# Patient Record
Sex: FEMALE | Race: WHITE | NOT HISPANIC OR LATINO | Employment: UNEMPLOYED | URBAN - NONMETROPOLITAN AREA
[De-identification: names, ages, dates, MRNs, and addresses within clinical notes are randomized per-mention and may not be internally consistent; named-entity substitution may affect disease eponyms.]

---

## 2023-07-11 DIAGNOSIS — R10.823 RIGHT LOWER QUADRANT REBOUND ABDOMINAL TENDERNESS: ICD-10-CM

## 2023-07-11 DIAGNOSIS — I95.9 HYPOTENSION, UNSPECIFIED: ICD-10-CM

## 2023-07-11 DIAGNOSIS — R19.5 OTHER FECAL ABNORMALITIES: ICD-10-CM

## 2023-07-11 DIAGNOSIS — Z83.79 FAMILY HISTORY OF OTHER DISEASES OF THE DIGESTIVE SYSTEM: ICD-10-CM

## 2023-07-11 DIAGNOSIS — R10.31 RIGHT LOWER QUADRANT PAIN: ICD-10-CM

## 2023-07-11 DIAGNOSIS — R63.4 ABNORMAL WEIGHT LOSS: ICD-10-CM

## 2023-07-21 ENCOUNTER — HOSPITAL ENCOUNTER (EMERGENCY)
Facility: HOSPITAL | Age: 28
Discharge: HOME | End: 2023-07-21
Attending: EMERGENCY MEDICINE
Payer: COMMERCIAL

## 2023-07-21 VITALS
SYSTOLIC BLOOD PRESSURE: 98 MMHG | BODY MASS INDEX: 19.49 KG/M2 | HEIGHT: 63 IN | HEART RATE: 80 BPM | TEMPERATURE: 97 F | RESPIRATION RATE: 16 BRPM | DIASTOLIC BLOOD PRESSURE: 58 MMHG | OXYGEN SATURATION: 98 % | WEIGHT: 110 LBS

## 2023-07-21 DIAGNOSIS — Z01.30 ENCOUNTER FOR BLOOD PRESSURE EXAMINATION: Primary | ICD-10-CM

## 2023-07-21 LAB
ANION GAP SERPL CALCULATED.3IONS-SCNC: 7 MMOL/L
ANISOCYTOSIS BLD QL SMEAR: PRESENT
ATRIAL RATE: 70 BPM
BASOPHILS # BLD MANUAL: 0 THOUSAND/UL (ref 0–0.1)
BASOPHILS NFR MAR MANUAL: 0 % (ref 0–1)
BUN SERPL-MCNC: 12 MG/DL (ref 5–25)
CALCIUM SERPL-MCNC: 8.5 MG/DL (ref 8.4–10.2)
CHLORIDE SERPL-SCNC: 105 MMOL/L (ref 96–108)
CO2 SERPL-SCNC: 24 MMOL/L (ref 21–32)
CREAT SERPL-MCNC: 0.58 MG/DL (ref 0.6–1.3)
EOSINOPHIL # BLD MANUAL: 0.19 THOUSAND/UL (ref 0–0.4)
EOSINOPHIL NFR BLD MANUAL: 5 % (ref 0–6)
ERYTHROCYTE [DISTWIDTH] IN BLOOD BY AUTOMATED COUNT: 13.3 % (ref 11.6–15.1)
GFR SERPL CREATININE-BSD FRML MDRD: 124 ML/MIN/1.73SQ M
GLUCOSE SERPL-MCNC: 79 MG/DL (ref 65–140)
HCT VFR BLD AUTO: 37.7 % (ref 34.8–46.1)
HGB BLD-MCNC: 12.4 G/DL (ref 11.5–15.4)
LG PLATELETS BLD QL SMEAR: PRESENT
LYMPHOCYTES # BLD AUTO: 2.03 THOUSAND/UL (ref 0.6–4.47)
LYMPHOCYTES # BLD AUTO: 53 % (ref 14–44)
MACROCYTES BLD QL AUTO: PRESENT
MCH RBC QN AUTO: 30.6 PG (ref 26.8–34.3)
MCHC RBC AUTO-ENTMCNC: 32.9 G/DL (ref 31.4–37.4)
MCV RBC AUTO: 93 FL (ref 82–98)
MONOCYTES # BLD AUTO: 0.34 THOUSAND/UL (ref 0–1.22)
MONOCYTES NFR BLD: 9 % (ref 4–12)
NEUTROPHILS # BLD MANUAL: 1.2 THOUSAND/UL (ref 1.85–7.62)
NEUTS BAND NFR BLD MANUAL: 3 % (ref 0–8)
NEUTS SEG NFR BLD AUTO: 29 % (ref 43–75)
PLATELET # BLD AUTO: 237 THOUSANDS/UL (ref 149–390)
PLATELET BLD QL SMEAR: ADEQUATE
PMV BLD AUTO: 10 FL (ref 8.9–12.7)
POIKILOCYTOSIS BLD QL SMEAR: PRESENT
POTASSIUM SERPL-SCNC: 3.9 MMOL/L (ref 3.5–5.3)
PR INTERVAL: 128 MS
QRS AXIS: 89 DEGREES
QRSD INTERVAL: 76 MS
QT INTERVAL: 380 MS
QTC INTERVAL: 410 MS
RBC # BLD AUTO: 4.05 MILLION/UL (ref 3.81–5.12)
SODIUM SERPL-SCNC: 136 MMOL/L (ref 135–147)
T WAVE AXIS: 23 DEGREES
VARIANT LYMPHS # BLD AUTO: 1 %
VENTRICULAR RATE: 70 BPM
WBC # BLD AUTO: 3.75 THOUSAND/UL (ref 4.31–10.16)

## 2023-07-21 PROCEDURE — 85007 BL SMEAR W/DIFF WBC COUNT: CPT | Performed by: EMERGENCY MEDICINE

## 2023-07-21 PROCEDURE — 80048 BASIC METABOLIC PNL TOTAL CA: CPT | Performed by: EMERGENCY MEDICINE

## 2023-07-21 PROCEDURE — 36415 COLL VENOUS BLD VENIPUNCTURE: CPT | Performed by: EMERGENCY MEDICINE

## 2023-07-21 PROCEDURE — 93010 ELECTROCARDIOGRAM REPORT: CPT | Performed by: INTERNAL MEDICINE

## 2023-07-21 PROCEDURE — 93005 ELECTROCARDIOGRAM TRACING: CPT

## 2023-07-21 PROCEDURE — 85027 COMPLETE CBC AUTOMATED: CPT | Performed by: EMERGENCY MEDICINE

## 2023-07-21 RX ADMIN — SODIUM CHLORIDE, SODIUM LACTATE, POTASSIUM CHLORIDE, AND CALCIUM CHLORIDE 1000 ML: .6; .31; .03; .02 INJECTION, SOLUTION INTRAVENOUS at 13:56

## 2023-07-21 NOTE — DISCHARGE INSTRUCTIONS
Have your family doctor recheck your white blood cell count in approximately 2 weeks. Follow-up with gastroenterology as soon as possible. Remember your appointment for your CAT scan on Monday. Return with any worsening.

## 2023-07-21 NOTE — ED PROVIDER NOTES
History  Chief Complaint   Patient presents with   • Hypotension     Pt reports she was at GI today for ongoing treatment. Reports she was sent here for BP 86/58. /95 with properly fitted cuff at triage. Reports feeling lightheaded      Patient is a 38-year-old female sent to the emergency room for evaluation of low blood pressure. Patient reportedly had a blood pressure of 84/58 and was sent to the emergency room. Patient also being evaluated by gastroenterology secondary to frequent diarrhea. Patient was to have a video visit today and missed that appointment secondary to being sent to the emergency department. Patient reports that she gets intermittent dizziness and diarrhea, but this has been ongoing and is why she is currently receiving a evaluation by GI. Patient is scheduled for CAT scan tomorrow and then GI follow-up. Currently in the emergency room patient reports the occasional and intermittent dizziness. She has no chest pain. No shortness of breath. She has been having intermittent episodes of diarrhea. She is currently having no abdominal pain. Patient reports to me that she currently feels normal.      History provided by:  Patient  Medical Problem  Severity:  Unable to specify  Timing:  Unable to specify  Associated symptoms: diarrhea (Ongoing)    Associated symptoms: no abdominal pain, no chest pain, no cough, no fatigue, no fever, no headaches, no myalgias, no nausea, no rash, no shortness of breath, no vomiting and no wheezing        None       History reviewed. No pertinent past medical history. History reviewed. No pertinent surgical history. History reviewed. No pertinent family history. I have reviewed and agree with the history as documented.     E-Cigarette/Vaping     E-Cigarette/Vaping Substances     Social History     Tobacco Use   • Smoking status: Every Day     Packs/day: 1.00     Types: Cigarettes   • Smokeless tobacco: Never   Substance Use Topics   • Alcohol use: Yes     Comment: occas   • Drug use: Never       Review of Systems   Constitutional: Positive for appetite change. Negative for fatigue and fever. Respiratory: Negative. Negative for cough, shortness of breath and wheezing. Cardiovascular: Negative for chest pain and palpitations. Gastrointestinal: Positive for diarrhea (Ongoing). Negative for abdominal distention, abdominal pain, constipation, nausea and vomiting. Genitourinary: Negative. Musculoskeletal: Negative for myalgias. Skin: Negative for rash. Neurological: Positive for dizziness (None currently). Negative for headaches. Hematological: Negative. All other systems reviewed and are negative. Physical Exam  Physical Exam  Vitals (Normotensive) and nursing note reviewed. Constitutional:       General: She is awake. Appearance: Normal appearance. She is well-developed. She is not ill-appearing or toxic-appearing. HENT:      Head: Normocephalic and atraumatic. Right Ear: External ear normal.      Left Ear: External ear normal.      Nose: Nose normal.      Mouth/Throat:      Mouth: Mucous membranes are moist.   Eyes:      General: Lids are normal. No scleral icterus. Extraocular Movements: Extraocular movements intact. Pupils: Pupils are equal, round, and reactive to light. Cardiovascular:      Rate and Rhythm: Normal rate and regular rhythm. Heart sounds: Normal heart sounds. No murmur heard. Pulmonary:      Effort: Pulmonary effort is normal. No respiratory distress. Breath sounds: Normal breath sounds. No wheezing, rhonchi or rales. Abdominal:      General: Abdomen is flat. There is no distension. Palpations: Abdomen is soft. Tenderness: There is no abdominal tenderness. There is no guarding or rebound. Musculoskeletal:         General: No swelling, tenderness or deformity. Normal range of motion. Cervical back: Normal range of motion and neck supple.    Skin:     General: Skin is warm and dry. Coloration: Skin is not jaundiced or pale. Findings: No rash. Neurological:      Mental Status: She is alert. Psychiatric:         Attention and Perception: Attention normal.         Mood and Affect: Mood normal.         Speech: Speech normal.         Behavior: Behavior normal.         Thought Content:  Thought content normal.         Judgment: Judgment normal.         Vital Signs  ED Triage Vitals [07/21/23 1317]   Temperature Pulse Respirations Blood Pressure SpO2   (!) 97 °F (36.1 °C) 80 16 129/95 98 %      Temp src Heart Rate Source Patient Position - Orthostatic VS BP Location FiO2 (%)   -- -- Lying Right arm --      Pain Score       No Pain           Vitals:    07/21/23 1317 07/21/23 1346 07/21/23 1411 07/21/23 1418   BP: 129/95 100/62 95/60 98/58   Pulse: 80      Patient Position - Orthostatic VS: Lying            Visual Acuity      ED Medications  Medications   lactated ringers bolus 1,000 mL (1,000 mL Intravenous New Bag 7/21/23 1356)       Diagnostic Studies  Results Reviewed     Procedure Component Value Units Date/Time    Basic metabolic panel [[id number]]  (Abnormal) Collected: 07/21/23 1355    Lab Status: Final result Specimen: Blood from Arm, Right Updated: 07/21/23 1413     Sodium 136 mmol/L      Potassium 3.9 mmol/L      Chloride 105 mmol/L      CO2 24 mmol/L      ANION GAP 7 mmol/L      BUN 12 mg/dL      Creatinine 0.58 mg/dL      Glucose 79 mg/dL      Calcium 8.5 mg/dL      eGFR 124 ml/min/1.73sq m     Narrative:      [place] guidelines for Chronic Kidney Disease (CKD):   •  Stage 1 with normal or high GFR (GFR > 90 mL/min/1.73 square meters)  •  Stage 2 Mild CKD (GFR = 60-89 mL/min/1.73 square meters)  •  Stage 3A Moderate CKD (GFR = 45-59 mL/min/1.73 square meters)  •  Stage 3B Moderate CKD (GFR = 30-44 mL/min/1.73 square meters)  •  Stage 4 Severe CKD (GFR = 15-29 mL/min/1.73 square meters)  •  Stage 5 End Stage CKD (GFR <15 mL/min/1.73 square meters)  Note: GFR calculation is accurate only with a steady state creatinine    CBC and differential [[id number]]  (Abnormal) Collected: 07/21/23 1355    Lab Status: Final result Specimen: Blood from Arm, Right Updated: 07/21/23 1400     WBC 3.75 Thousand/uL      RBC 4.05 Million/uL      Hemoglobin 12.4 g/dL      Hematocrit 37.7 %      MCV 93 fL      MCH 30.6 pg      MCHC 32.9 g/dL      RDW 13.3 %      MPV 10.0 fL      Platelets 205 Thousands/uL     Manual Differential(PHLEBS Do Not Order) [[id number]] Collected: 07/21/23 1355    Lab Status: In process Specimen: Blood from Arm, Right Updated: 07/21/23 1359                 No orders to display              Procedures  ECG 12 Lead Documentation Only    Date/Time: 7/21/2023 2:13 PM    Performed by: Rigoberto Chamberlain DO  Authorized by: Rigoberto Chamberlain DO    Indications / Diagnosis:  Chest pain  ECG reviewed by me, the ED Provider: yes    Patient location:  ED  Previous ECG:     Previous ECG:  Unavailable  Interpretation:     Interpretation: normal    Rate:     ECG rate assessment: normal    Rhythm:     Rhythm: sinus rhythm    Ectopy:     Ectopy: none    QRS:     QRS axis:  Normal  Conduction:     Conduction: normal    ST segments:     ST segments:  Normal  T waves:     T waves: normal               ED Course  ED Course as of 07/21/23 1424   Fri Jul 21, 2023   1324 "The results were charted in Vital Signs. BP Readings from Last 3 Encounters:   07/21/23 84/58   07/17/23 86/60   07/11/23 92/56"     1346 With appropriate size small adult cuff, patient has had 3 normal blood pressure readings. 1346 Patient reports that blood pressure cuffs that are used on her are frequently loose and oversized. 1419 Patient's blood pressure have been normotensive in the emergency department. Patient is stable for discharge. Mildly leukopenic. Recheck as an outpatient.                                SBIRT 22yo+    Flowsheet Row Most Recent Value   Initial Alcohol Screen: US AUDIT-C     1. How often do you have a drink containing alcohol? 0 Filed at: 07/21/2023 3979   2. How many drinks containing alcohol do you have on a typical day you are drinking? 0 Filed at: 07/21/2023 5509   3a. Male UNDER 65: How often do you have five or more drinks on one occasion? 0 Filed at: 07/21/2023 1358   3b. FEMALE Any Age, or MALE 65+: How often do you have 4 or more drinks on one occassion? 0 Filed at: 07/21/2023 4778   Audit-C Score 0 Filed at: 07/21/2023 0997   ALEM: How many times in the past year have you. .. Used an illegal drug or used a prescription medication for non-medical reasons? Never Filed at: 07/21/2023 9333                    Medical Decision Making  Encounter for blood pressure examination: self-limited or minor problem     Details: Patient has been normotensive in the emergency department. Suspect erroneous outpatient readings  Amount and/or Complexity of Data Reviewed  Labs: ordered. Disposition  Final diagnoses:   Encounter for blood pressure examination     Time reflects when diagnosis was documented in both MDM as applicable and the Disposition within this note     Time User Action Codes Description Comment    7/21/2023  1:47 PM Elinore Model Add [Z01.30,  Z79.3] Encounter for check of blood pressure while on oral contraceptives     7/21/2023  1:47 PM Elinore Model Remove [Z01.30,  Z79.3] Encounter for check of blood pressure while on oral contraceptives     7/21/2023  1:47 PM Elinore Model Add [Z01.30] Encounter for blood pressure examination       ED Disposition     ED Disposition   Discharge    Condition   Stable    Date/Time   Fri Jul 21, 2023  2:22 PM    Comment   Jojo Tristan discharge to home/self care.                Follow-up Information     Follow up With Specialties Details Why Contact Info    Lois Rees MD Internal Medicine In 2 weeks for repeat of mildly abdnormal white blood cell count [address] [facility] [address]  [phone]            Patient's Medications    No medications on file       No discharge procedures on file.     PDMP Review     None          ED Provider  Electronically Signed by           Windy Bryant DO  07/21/23 5500

## 2023-07-24 ENCOUNTER — HOSPITAL ENCOUNTER (OUTPATIENT)
Dept: CT IMAGING | Facility: HOSPITAL | Age: 28
Discharge: HOME | End: 2023-07-24
Payer: COMMERCIAL

## 2023-07-24 DIAGNOSIS — R10.823 RIGHT LOWER QUADRANT REBOUND ABDOMINAL TENDERNESS: ICD-10-CM

## 2023-07-24 DIAGNOSIS — R63.4 ABNORMAL WEIGHT LOSS: ICD-10-CM

## 2023-07-24 DIAGNOSIS — R10.31 RIGHT LOWER QUADRANT PAIN: ICD-10-CM

## 2023-07-24 DIAGNOSIS — Z83.79 FAMILY HISTORY OF OTHER DISEASES OF THE DIGESTIVE SYSTEM: ICD-10-CM

## 2023-07-24 DIAGNOSIS — I95.9 HYPOTENSION, UNSPECIFIED: ICD-10-CM

## 2023-07-24 DIAGNOSIS — R19.5 OTHER FECAL ABNORMALITIES: ICD-10-CM

## 2023-07-24 PROCEDURE — 74177 CT ABD & PELVIS W/CONTRAST: CPT

## 2023-07-24 RX ADMIN — IOHEXOL 100 ML: 350 INJECTION, SOLUTION INTRAVENOUS at 12:58

## 2023-07-24 RX ADMIN — IOHEXOL 50 ML: 240 INJECTION, SOLUTION INTRATHECAL; INTRAVASCULAR; INTRAVENOUS; ORAL at 12:58

## 2023-09-21 ENCOUNTER — OFFICE VISIT (OUTPATIENT)
Dept: URGENT CARE | Facility: CLINIC | Age: 28
End: 2023-09-21
Payer: COMMERCIAL

## 2023-09-21 VITALS
HEIGHT: 63 IN | OXYGEN SATURATION: 99 % | WEIGHT: 110 LBS | TEMPERATURE: 98.3 F | RESPIRATION RATE: 17 BRPM | DIASTOLIC BLOOD PRESSURE: 62 MMHG | BODY MASS INDEX: 19.49 KG/M2 | SYSTOLIC BLOOD PRESSURE: 118 MMHG | HEART RATE: 61 BPM

## 2023-09-21 DIAGNOSIS — R42 VERTIGO: Primary | ICD-10-CM

## 2023-09-21 PROCEDURE — S9088 SERVICES PROVIDED IN URGENT: HCPCS

## 2023-09-21 PROCEDURE — 99213 OFFICE O/P EST LOW 20 MIN: CPT

## 2023-09-21 RX ORDER — MECLIZINE HYDROCHLORIDE 25 MG/1
25 TABLET ORAL 3 TIMES DAILY PRN
Qty: 20 TABLET | Refills: 0 | Status: SHIPPED | OUTPATIENT
Start: 2023-09-21

## 2023-09-21 NOTE — PATIENT INSTRUCTIONS
Take Meclizine as needed for dizziness and nausea  Follow up with ENT  Continue drinking plenty of water  Follow up with PCP in 3-5 days. Proceed to  ER if symptoms worsen.

## 2023-09-21 NOTE — LETTER
September 21, 2023     Patient: Kalpesh Peace   YOB: 1995   Date of Visit: 9/21/2023       To Whom It May Concern: It is my medical opinion that Eligio Hernandez may return to work on 9/22/2023 . If you have any questions or concerns, please don't hesitate to call.          Sincerely,        Melvi Aguilar PA-C    CC: No Recipients

## 2023-09-21 NOTE — PROGRESS NOTES
[facility]        NAME: Maggi Mcdermott is a 25 y.o. female  : 1995    MRN: [id number]  DATE: 2023  TIME: 1:52 PM    Assessment and Plan   Vertigo [R42]  1. Vertigo  meclizine (ANTIVERT) 25 mg tablet    Ambulatory Referral to Otolaryngology            Patient Instructions     Take Meclizine as needed for dizziness and nausea  Follow up with ENT  Continue drinking plenty of water  Follow up with PCP in 3-5 days. Proceed to  ER if symptoms worsen. Chief Complaint     Chief Complaint   Patient presents with   • Off balance     Reports off balance notices it with  changing of  head posititon associated with  right sided pressure in temporal area of skull near ear. Almost missed chair at work today. Reports has had similar issues since age 14. School nurse looked in ear and saw"bulging" in right ear. History of Present Illness       Patient is presenting for being off balance when changing positions and has R sided pressure near her ear. She stated she has dealt with similar issues since she was 14. She stated that before arrival the school nurse looked in her ear and saw "bulging." She denies ear pain, fevers, being sick recently, SOB, CP, or other symptoms. She stated she is getting dentures soon so is unsure if it is related to dental problems. Review of Systems   Review of Systems   Constitutional: Negative for chills, diaphoresis, fatigue and fever. HENT: Negative for congestion, ear pain, postnasal drip, rhinorrhea, sinus pressure, sore throat and trouble swallowing. Pressure on R side of head near ear   Respiratory: Negative for cough, chest tightness, shortness of breath and wheezing. Cardiovascular: Negative for chest pain and palpitations. Skin: Negative for color change. Neurological: Negative for light-headedness. Feels off balance   Psychiatric/Behavioral: Negative for sleep disturbance.          Current Medications       Current Outpatient Medications:   •  meclizine (ANTIVERT) 25 mg tablet, Take 1 tablet (25 mg total) by mouth 3 (three) times a day as needed for dizziness, Disp: 20 tablet, Rfl: 0    Current Allergies     Allergies as of 09/21/2023 - Reviewed 07/24/2023   Allergen Reaction Noted   • Nickel Rash 07/21/2023            The following portions of the patient's history were reviewed and updated as appropriate: allergies, current medications, past family history, past medical history, past social history, past surgical history and problem list.     Past Medical History:   Diagnosis Date   • Anxiety        Past Surgical History:   Procedure Laterality Date   • US GUIDED LYMPHNODE INJECTION  5/3/2019       Family History   Problem Relation Age of Onset   • Lupus Mother          Medications have been verified. Objective   /62   Pulse 61   Temp 98.3 °F (36.8 °C)   Resp 17   Ht 5' 3" (1.6 m)   Wt 49.9 kg (110 lb)   LMP 09/17/2023 (Exact Date)   SpO2 99%   BMI 19.49 kg/m²        Physical Exam     Physical Exam  Constitutional:       Appearance: Normal appearance. HENT:      Right Ear: Tympanic membrane and external ear normal.      Left Ear: Tympanic membrane and external ear normal.      Mouth/Throat:      Mouth: Mucous membranes are moist.      Pharynx: Oropharynx is clear. Eyes:      Extraocular Movements: Extraocular movements intact. Pupils: Pupils are equal, round, and reactive to light. Cardiovascular:      Rate and Rhythm: Normal rate and regular rhythm. Pulses: Normal pulses. Heart sounds: Normal heart sounds. Pulmonary:      Effort: Pulmonary effort is normal.      Breath sounds: Normal breath sounds. Skin:     General: Skin is warm and dry. Neurological:      General: No focal deficit present. Mental Status: She is alert and oriented to person, place, and time. Mental status is at baseline.

## 2023-10-05 ENCOUNTER — OFFICE VISIT (OUTPATIENT)
Dept: URGENT CARE | Facility: CLINIC | Age: 28
End: 2023-10-05
Payer: COMMERCIAL

## 2023-10-05 VITALS
SYSTOLIC BLOOD PRESSURE: 104 MMHG | WEIGHT: 110 LBS | DIASTOLIC BLOOD PRESSURE: 60 MMHG | HEIGHT: 63 IN | HEART RATE: 107 BPM | OXYGEN SATURATION: 98 % | RESPIRATION RATE: 17 BRPM | BODY MASS INDEX: 19.49 KG/M2 | TEMPERATURE: 99.7 F

## 2023-10-05 DIAGNOSIS — J02.9 SORE THROAT: ICD-10-CM

## 2023-10-05 DIAGNOSIS — U07.1 COVID-19: Primary | ICD-10-CM

## 2023-10-05 LAB
S PYO AG THROAT QL: NEGATIVE
SARS-COV-2 AG UPPER RESP QL IA: POSITIVE
VALID CONTROL: ABNORMAL

## 2023-10-05 PROCEDURE — 87880 STREP A ASSAY W/OPTIC: CPT

## 2023-10-05 PROCEDURE — S9088 SERVICES PROVIDED IN URGENT: HCPCS

## 2023-10-05 PROCEDURE — 87811 SARS-COV-2 COVID19 W/OPTIC: CPT

## 2023-10-05 PROCEDURE — 99213 OFFICE O/P EST LOW 20 MIN: CPT

## 2023-10-05 RX ORDER — ACETAMINOPHEN 500 MG
500 TABLET ORAL EVERY 6 HOURS PRN
COMMUNITY

## 2023-10-05 NOTE — PATIENT INSTRUCTIONS
Plenty of fluids  Can use honey   Cool mist humidifier   Warm gargle with salt water for sore throat   Use Tylenol/ibuprofen as needed for fever or pain   Follow up with PCP in 3-5 days. Proceed to  ER if symptoms worsen.

## 2023-10-05 NOTE — LETTER
[address] [facility]  [address] [facility] [address]  Dept: [phone]    October 5, 2023    Patient: Yobany Armenta  YOB: 1995    Yobany Armenta was seen and evaluated at our [facility]. Covid test is positive. She may return to work on 10/9/2023, as this is 5 days from the onset of symptoms. Upon return, they must then adhere to strict masking for an additional 5 days.     Sincerely,    Wendie Liang PA-C

## 2023-12-14 ENCOUNTER — OFFICE VISIT (OUTPATIENT)
Dept: URGENT CARE | Facility: CLINIC | Age: 28
End: 2023-12-14
Payer: COMMERCIAL

## 2023-12-14 VITALS
HEART RATE: 78 BPM | RESPIRATION RATE: 16 BRPM | BODY MASS INDEX: 18.61 KG/M2 | WEIGHT: 109 LBS | TEMPERATURE: 98.9 F | OXYGEN SATURATION: 99 % | DIASTOLIC BLOOD PRESSURE: 67 MMHG | HEIGHT: 64 IN | SYSTOLIC BLOOD PRESSURE: 112 MMHG

## 2023-12-14 DIAGNOSIS — B34.9 VIRAL SYNDROME: Primary | ICD-10-CM

## 2023-12-14 LAB — S PYO AG THROAT QL: NEGATIVE

## 2023-12-14 PROCEDURE — S9088 SERVICES PROVIDED IN URGENT: HCPCS

## 2023-12-14 PROCEDURE — 87880 STREP A ASSAY W/OPTIC: CPT

## 2023-12-14 PROCEDURE — 99213 OFFICE O/P EST LOW 20 MIN: CPT

## 2023-12-14 NOTE — LETTER
December 14, 2023     Patient: Chele Ricks   YOB: 1995   Date of Visit: 12/14/2023       To Whom it May Concern:    Cayla Amin was seen in my clinic on 12/14/2023. She may return to work on 12/14/2023 . If you have any questions or concerns, please don't hesitate to call.          Sincerely,          ERIK Amos        CC: No Recipients

## 2023-12-14 NOTE — PROGRESS NOTES
[facility]        NAME: Andra Norwood is a 25 y.o. female  : 1995    MRN: [id number]  DATE: 2023  TIME: 8:26 AM    Assessment and Plan   Viral syndrome [B34.9]  1. Viral syndrome  POCT rapid strepA        Rapid POC strep testing negative. Symptoms likely related to viral etiology and encouraged continued supportive measures. Follow up with PCP in 3-5 days or proceed to emergency department for worsening symptoms. Patient verbalized understanding of instructions given. Patient Instructions     Patient Instructions     Rapid POC strep testing negative  Continue with supportive measures, OTC Tylenol/Ibuprofen, nasal decongestants, and cough suppressants   Cool mist humidifiers, throat lozenges, salt gargles, honey, increased fluid intake and rest   Follow up with PCP in 3-5 days  Present to ER if symptoms worsen     Viral Syndrome   AMBULATORY CARE:   Viral syndrome  is a term used for symptoms of an infection caused by a virus. Viruses are spread easily from person to person on shared items. Signs and symptoms  may start slowly or suddenly and last hours to days. They can be mild to severe and can change over days or hours. You may have any of the following:  Fever and chills    A runny or stuffy nose    Cough, sore throat, or hoarseness    Headache, or pain and pressure around your eyes    Muscle aches and joint pain    Shortness of breath or wheezing    Abdominal pain, cramps, and diarrhea    Nausea, vomiting, or loss of appetite    Call your local emergency number (911 in the [address]), or have someone call if:   You have a seizure. You cannot be woken. You have chest pain or trouble breathing. Seek care immediately if:   You have a stiff neck, a bad headache, and sensitivity to light. You feel weak, dizzy, or confused. You stop urinating or urinate a lot less than usual.    You cough up blood or thick yellow or green mucus.     You have severe abdominal pain or your abdomen is larger than usual.    Call your doctor if:   Your symptoms do not get better with treatment or get worse after 3 days. You have a rash or ear pain. You have burning when you urinate. You have questions or concerns about your condition or care. Treatment for viral syndrome  may include medicines to manage your symptoms. Antibiotics are not given for a viral infection. You may  need any of the following:  Acetaminophen  decreases pain and fever. It is available without a doctor's order. Ask how much to take and how often to take it. Follow directions. Read the labels of all other medicines you are using to see if they also contain acetaminophen, or ask your doctor or pharmacist. Acetaminophen can cause liver damage if not taken correctly. NSAIDs , such as ibuprofen, help decrease swelling, pain, and fever. NSAIDs can cause stomach bleeding or kidney problems in certain people. If you take blood thinner medicine, always ask your healthcare provider if NSAIDs are safe for you. Always read the medicine label and follow directions. Cold medicine  helps decrease swelling, control a cough, and relieve chest or nasal congestion. Saline nasal spray  helps decrease nasal congestion. Manage your symptoms:   Drink liquids as directed to prevent dehydration. Ask how much liquid to drink each day and which liquids are best for you. Do not drink liquids with caffeine. Caffeine can make dehydration worse. Get plenty of rest to help your body heal.  Take naps throughout the day. Ask your healthcare provider when you can return to work and your normal activities. Use a cool mist humidifier  to increase air moisture in your home. This may make it easier for you to breathe and help decrease your cough. Drink tea with honey or use cough drops for a sore throat. Cough drops are available without a doctor's order. Follow directions for taking cough drops.     Do not smoke or be close to anyone who is smoking. Nicotine and other chemicals in cigarettes and cigars can cause lung damage. Smoking can also delay healing. Ask your healthcare provider for information if you currently smoke and need help to quit. E-cigarettes or smokeless tobacco still contain nicotine. Talk to your healthcare provider before you use these products. Prevent the spread of germs:   Wash your hands often throughout the day. Use soap and water. Rub your soapy hands together, lacing your fingers, for at least 20 seconds. Rinse with warm, running water. Dry your hands with a clean towel or paper towel. Use hand  that contains alcohol if soap and water are not available. Teach children how to wash their hands and use hand . Cover sneezes and coughs. Turn your face away and cover your mouth and nose with a tissue. Throw the tissue away. Use the bend of your arm if a tissue is not available. Then wash your hands well with soap and water or use hand . Teach children how to cover a cough or sneeze. Stay home while you are sick. Avoid crowds as much as possible. Get the influenza (flu) vaccine as soon as recommended each year. The flu vaccine is available starting in September or October. Ask your healthcare provider about the pneumonia vaccine. This vaccine is usually recommended every 5 years in older adults. Follow up with your doctor as directed:  Write down your questions so you remember to ask them during your visits. © Copyright Jessenia Joshua 2023 Information is for End User's use only and may not be sold, redistributed or otherwise used for commercial purposes. The above information is an  only. It is not intended as medical advice for individual conditions or treatments. Talk to your doctor, nurse or pharmacist before following any medical regimen to see if it is safe and effective for you.         Chief Complaint     Chief Complaint   Patient presents with Sore Throat     Sore throat, h/a, lowgrade fever, abdominal cramps and tired x 2 days         History of Present Illness       27-year-old female with no significant past medical history presents with complaints of headache, nasal congestion, sore throat, cough, and fatigue x 2 days. Tmax 99. No vomiting or diarrhea. No known sick contacts or exposures. Patient reports positive for COVID-19 in October. Review of Systems   Review of Systems   Constitutional:  Positive for fatigue. Negative for chills and fever. HENT:  Positive for congestion, rhinorrhea and sore throat. Negative for ear discharge, ear pain, trouble swallowing and voice change. Eyes:  Negative for discharge. Respiratory:  Positive for cough. Gastrointestinal:  Negative for abdominal pain, diarrhea, nausea and vomiting. Skin:  Negative for rash. Neurological:  Positive for headaches. Current Medications       Current Outpatient Medications:     acetaminophen (TYLENOL) 500 mg tablet, Take 500 mg by mouth every 6 (six) hours as needed for mild pain, Disp: , Rfl:     meclizine (ANTIVERT) 25 mg tablet, Take 1 tablet (25 mg total) by mouth 3 (three) times a day as needed for dizziness, Disp: 20 tablet, Rfl: 0    Current Allergies     Allergies as of 12/14/2023 - Reviewed 12/14/2023   Allergen Reaction Noted    Nickel Rash 07/21/2023            The following portions of the patient's history were reviewed and updated as appropriate: allergies, current medications, past family history, past medical history, past social history, past surgical history and problem list.     Past Medical History:   Diagnosis Date    Anxiety     Vertigo        Past Surgical History:   Procedure Laterality Date    US GUIDED LYMPHNODE INJECTION  5/3/2019       Family History   Problem Relation Age of Onset    Lupus Mother     No Known Problems Father          Medications have been verified.         Objective   /67   Pulse 78   Temp 98.9 °F (37.2 °C)   Resp 16   Ht 5' 4" (1.626 m)   Wt 49.4 kg (109 lb)   LMP 12/11/2023   SpO2 99%   BMI 18.71 kg/m²   Patient's last menstrual period was 12/11/2023. Physical Exam     Physical Exam  Vitals and nursing note reviewed. Constitutional:       General: She is not in acute distress. Appearance: She is not toxic-appearing. HENT:      Head: Normocephalic. Right Ear: Tympanic membrane, ear canal and external ear normal.      Left Ear: Tympanic membrane, ear canal and external ear normal.      Nose: Congestion present. Mouth/Throat:      Mouth: Mucous membranes are moist.      Pharynx: Posterior oropharyngeal erythema present. Tonsils: No tonsillar exudate. Comments: Cobblestone appearance   Eyes:      Conjunctiva/sclera: Conjunctivae normal.   Cardiovascular:      Rate and Rhythm: Normal rate and regular rhythm. Heart sounds: Normal heart sounds. Pulmonary:      Effort: Pulmonary effort is normal. No respiratory distress. Breath sounds: Normal breath sounds. No stridor. No wheezing, rhonchi or rales. Lymphadenopathy:      Cervical: No cervical adenopathy. Skin:     General: Skin is warm and dry. Neurological:      Mental Status: She is alert and oriented to person, place, and time. Gait: Gait is intact.    Psychiatric:         Mood and Affect: Mood normal.         Behavior: Behavior normal.

## 2023-12-14 NOTE — PATIENT INSTRUCTIONS
Rapid POC strep testing negative  Continue with supportive measures, OTC Tylenol/Ibuprofen, nasal decongestants, and cough suppressants   Cool mist humidifiers, throat lozenges, salt gargles, honey, increased fluid intake and rest   Follow up with PCP in 3-5 days  Present to ER if symptoms worsen     Viral Syndrome   AMBULATORY CARE:   Viral syndrome  is a term used for symptoms of an infection caused by a virus. Viruses are spread easily from person to person on shared items. Signs and symptoms  may start slowly or suddenly and last hours to days. They can be mild to severe and can change over days or hours. You may have any of the following:  Fever and chills    A runny or stuffy nose    Cough, sore throat, or hoarseness    Headache, or pain and pressure around your eyes    Muscle aches and joint pain    Shortness of breath or wheezing    Abdominal pain, cramps, and diarrhea    Nausea, vomiting, or loss of appetite    Call your local emergency number (911 in the [address]), or have someone call if:   You have a seizure. You cannot be woken. You have chest pain or trouble breathing. Seek care immediately if:   You have a stiff neck, a bad headache, and sensitivity to light. You feel weak, dizzy, or confused. You stop urinating or urinate a lot less than usual.    You cough up blood or thick yellow or green mucus. You have severe abdominal pain or your abdomen is larger than usual.    Call your doctor if:   Your symptoms do not get better with treatment or get worse after 3 days. You have a rash or ear pain. You have burning when you urinate. You have questions or concerns about your condition or care. Treatment for viral syndrome  may include medicines to manage your symptoms. Antibiotics are not given for a viral infection. You may  need any of the following:  Acetaminophen  decreases pain and fever. It is available without a doctor's order. Ask how much to take and how often to take it. Follow directions. Read the labels of all other medicines you are using to see if they also contain acetaminophen, or ask your doctor or pharmacist. Acetaminophen can cause liver damage if not taken correctly. NSAIDs , such as ibuprofen, help decrease swelling, pain, and fever. NSAIDs can cause stomach bleeding or kidney problems in certain people. If you take blood thinner medicine, always ask your healthcare provider if NSAIDs are safe for you. Always read the medicine label and follow directions. Cold medicine  helps decrease swelling, control a cough, and relieve chest or nasal congestion. Saline nasal spray  helps decrease nasal congestion. Manage your symptoms:   Drink liquids as directed to prevent dehydration. Ask how much liquid to drink each day and which liquids are best for you. Do not drink liquids with caffeine. Caffeine can make dehydration worse. Get plenty of rest to help your body heal.  Take naps throughout the day. Ask your healthcare provider when you can return to work and your normal activities. Use a cool mist humidifier  to increase air moisture in your home. This may make it easier for you to breathe and help decrease your cough. Drink tea with honey or use cough drops for a sore throat. Cough drops are available without a doctor's order. Follow directions for taking cough drops. Do not smoke or be close to anyone who is smoking. Nicotine and other chemicals in cigarettes and cigars can cause lung damage. Smoking can also delay healing. Ask your healthcare provider for information if you currently smoke and need help to quit. E-cigarettes or smokeless tobacco still contain nicotine. Talk to your healthcare provider before you use these products. Prevent the spread of germs:   Wash your hands often throughout the day. Use soap and water. Rub your soapy hands together, lacing your fingers, for at least 20 seconds. Rinse with warm, running water.  Dry your hands with a clean towel or paper towel. Use hand  that contains alcohol if soap and water are not available. Teach children how to wash their hands and use hand . Cover sneezes and coughs. Turn your face away and cover your mouth and nose with a tissue. Throw the tissue away. Use the bend of your arm if a tissue is not available. Then wash your hands well with soap and water or use hand . Teach children how to cover a cough or sneeze. Stay home while you are sick. Avoid crowds as much as possible. Get the influenza (flu) vaccine as soon as recommended each year. The flu vaccine is available starting in September or October. Ask your healthcare provider about the pneumonia vaccine. This vaccine is usually recommended every 5 years in older adults. Follow up with your doctor as directed:  Write down your questions so you remember to ask them during your visits. © Copyright Karon Gamboa 2023 Information is for End User's use only and may not be sold, redistributed or otherwise used for commercial purposes. The above information is an  only. It is not intended as medical advice for individual conditions or treatments. Talk to your doctor, nurse or pharmacist before following any medical regimen to see if it is safe and effective for you.

## 2024-04-19 ENCOUNTER — OFFICE VISIT (OUTPATIENT)
Dept: URGENT CARE | Facility: CLINIC | Age: 29
End: 2024-04-19
Payer: COMMERCIAL

## 2024-04-19 ENCOUNTER — APPOINTMENT (EMERGENCY)
Dept: CT IMAGING | Facility: HOSPITAL | Age: 29
End: 2024-04-19
Payer: COMMERCIAL

## 2024-04-19 ENCOUNTER — HOSPITAL ENCOUNTER (EMERGENCY)
Facility: HOSPITAL | Age: 29
Discharge: HOME | End: 2024-04-19
Attending: EMERGENCY MEDICINE
Payer: COMMERCIAL

## 2024-04-19 VITALS
SYSTOLIC BLOOD PRESSURE: 98 MMHG | TEMPERATURE: 98 F | HEART RATE: 104 BPM | WEIGHT: 110 LBS | DIASTOLIC BLOOD PRESSURE: 78 MMHG | HEIGHT: 64 IN | BODY MASS INDEX: 18.78 KG/M2 | OXYGEN SATURATION: 98 % | RESPIRATION RATE: 16 BRPM

## 2024-04-19 VITALS
HEART RATE: 70 BPM | SYSTOLIC BLOOD PRESSURE: 111 MMHG | DIASTOLIC BLOOD PRESSURE: 58 MMHG | OXYGEN SATURATION: 100 % | TEMPERATURE: 98 F | RESPIRATION RATE: 16 BRPM

## 2024-04-19 DIAGNOSIS — R10.11 RIGHT UPPER QUADRANT ABDOMINAL PAIN: Primary | ICD-10-CM

## 2024-04-19 LAB
ALBUMIN SERPL BCP-MCNC: 4.6 G/DL (ref 3.5–5)
ALP SERPL-CCNC: 49 U/L (ref 34–104)
ALT SERPL W P-5'-P-CCNC: 9 U/L (ref 7–52)
ANION GAP SERPL CALCULATED.3IONS-SCNC: 6 MMOL/L (ref 4–13)
AST SERPL W P-5'-P-CCNC: 14 U/L (ref 13–39)
BASOPHILS # BLD AUTO: 0.03 THOUSANDS/ÂΜL (ref 0–0.1)
BASOPHILS NFR BLD AUTO: 1 % (ref 0–1)
BILIRUB SERPL-MCNC: 0.41 MG/DL (ref 0.2–1)
BILIRUB UR QL STRIP: NEGATIVE
BUN SERPL-MCNC: 16 MG/DL (ref 5–25)
CALCIUM SERPL-MCNC: 9 MG/DL (ref 8.4–10.2)
CHLORIDE SERPL-SCNC: 105 MMOL/L (ref 96–108)
CLARITY UR: NORMAL
CO2 SERPL-SCNC: 25 MMOL/L (ref 21–32)
COLOR UR: YELLOW
CREAT SERPL-MCNC: 0.6 MG/DL (ref 0.6–1.3)
EOSINOPHIL # BLD AUTO: 0.08 THOUSAND/ÂΜL (ref 0–0.61)
EOSINOPHIL NFR BLD AUTO: 1 % (ref 0–6)
ERYTHROCYTE [DISTWIDTH] IN BLOOD BY AUTOMATED COUNT: 12.2 % (ref 11.6–15.1)
EXT PREGNANCY TEST URINE: NEGATIVE
EXT. CONTROL: NORMAL
GFR SERPL CREATININE-BSD FRML MDRD: 122 ML/MIN/1.73SQ M
GLUCOSE SERPL-MCNC: 85 MG/DL (ref 65–140)
GLUCOSE UR STRIP-MCNC: NEGATIVE MG/DL
HCT VFR BLD AUTO: 40.2 % (ref 34.8–46.1)
HGB BLD-MCNC: 13.2 G/DL (ref 11.5–15.4)
HGB UR QL STRIP.AUTO: NEGATIVE
IMM GRANULOCYTES # BLD AUTO: 0.01 THOUSAND/UL (ref 0–0.2)
IMM GRANULOCYTES NFR BLD AUTO: 0 % (ref 0–2)
KETONES UR STRIP-MCNC: NEGATIVE MG/DL
LACTATE SERPL-SCNC: 1.1 MMOL/L (ref 0.5–2)
LEUKOCYTE ESTERASE UR QL STRIP: NEGATIVE
LIPASE SERPL-CCNC: 20 U/L (ref 11–82)
LYMPHOCYTES # BLD AUTO: 2.23 THOUSANDS/ÂΜL (ref 0.6–4.47)
LYMPHOCYTES NFR BLD AUTO: 36 % (ref 14–44)
MCH RBC QN AUTO: 30.5 PG (ref 26.8–34.3)
MCHC RBC AUTO-ENTMCNC: 32.8 G/DL (ref 31.4–37.4)
MCV RBC AUTO: 93 FL (ref 82–98)
MONOCYTES # BLD AUTO: 0.56 THOUSAND/ÂΜL (ref 0.17–1.22)
MONOCYTES NFR BLD AUTO: 9 % (ref 4–12)
NEUTROPHILS # BLD AUTO: 3.3 THOUSANDS/ÂΜL (ref 1.85–7.62)
NEUTS SEG NFR BLD AUTO: 53 % (ref 43–75)
NITRITE UR QL STRIP: NEGATIVE
NRBC BLD AUTO-RTO: 0 /100 WBCS
PH UR STRIP.AUTO: 5.5 [PH]
PLATELET # BLD AUTO: 351 THOUSANDS/UL (ref 149–390)
PMV BLD AUTO: 9.7 FL (ref 8.9–12.7)
POTASSIUM SERPL-SCNC: 3.6 MMOL/L (ref 3.5–5.3)
PROT SERPL-MCNC: 7.7 G/DL (ref 6.4–8.4)
PROT UR STRIP-MCNC: NEGATIVE MG/DL
RBC # BLD AUTO: 4.33 MILLION/UL (ref 3.81–5.12)
SODIUM SERPL-SCNC: 136 MMOL/L (ref 135–147)
SP GR UR STRIP.AUTO: 1.02 (ref 1–1.03)
UROBILINOGEN UR QL STRIP.AUTO: 0.2 E.U./DL
WBC # BLD AUTO: 6.21 THOUSAND/UL (ref 4.31–10.16)

## 2024-04-19 PROCEDURE — 80053 COMPREHEN METABOLIC PANEL: CPT | Performed by: EMERGENCY MEDICINE

## 2024-04-19 PROCEDURE — 96375 TX/PRO/DX INJ NEW DRUG ADDON: CPT

## 2024-04-19 PROCEDURE — 99285 EMERGENCY DEPT VISIT HI MDM: CPT | Performed by: EMERGENCY MEDICINE

## 2024-04-19 PROCEDURE — 36415 COLL VENOUS BLD VENIPUNCTURE: CPT | Performed by: EMERGENCY MEDICINE

## 2024-04-19 PROCEDURE — 83605 ASSAY OF LACTIC ACID: CPT | Performed by: EMERGENCY MEDICINE

## 2024-04-19 PROCEDURE — 74177 CT ABD & PELVIS W/CONTRAST: CPT

## 2024-04-19 PROCEDURE — 85025 COMPLETE CBC W/AUTO DIFF WBC: CPT | Performed by: EMERGENCY MEDICINE

## 2024-04-19 PROCEDURE — 96361 HYDRATE IV INFUSION ADD-ON: CPT

## 2024-04-19 PROCEDURE — 83690 ASSAY OF LIPASE: CPT | Performed by: EMERGENCY MEDICINE

## 2024-04-19 PROCEDURE — S9088 SERVICES PROVIDED IN URGENT: HCPCS

## 2024-04-19 PROCEDURE — 96374 THER/PROPH/DIAG INJ IV PUSH: CPT

## 2024-04-19 PROCEDURE — 81003 URINALYSIS AUTO W/O SCOPE: CPT | Performed by: EMERGENCY MEDICINE

## 2024-04-19 PROCEDURE — 99213 OFFICE O/P EST LOW 20 MIN: CPT

## 2024-04-19 PROCEDURE — 99284 EMERGENCY DEPT VISIT MOD MDM: CPT

## 2024-04-19 PROCEDURE — 81025 URINE PREGNANCY TEST: CPT | Performed by: EMERGENCY MEDICINE

## 2024-04-19 RX ORDER — FAMOTIDINE 10 MG/ML
20 INJECTION, SOLUTION INTRAVENOUS ONCE
Status: COMPLETED | OUTPATIENT
Start: 2024-04-19 | End: 2024-04-19

## 2024-04-19 RX ORDER — FAMOTIDINE 20 MG/1
20 TABLET, FILM COATED ORAL 2 TIMES DAILY
Qty: 30 TABLET | Refills: 0 | Status: SHIPPED | OUTPATIENT
Start: 2024-04-19

## 2024-04-19 RX ORDER — ONDANSETRON 2 MG/ML
4 INJECTION INTRAMUSCULAR; INTRAVENOUS ONCE
Status: COMPLETED | OUTPATIENT
Start: 2024-04-19 | End: 2024-04-19

## 2024-04-19 RX ADMIN — IOHEXOL 85 ML: 350 INJECTION, SOLUTION INTRAVENOUS at 20:06

## 2024-04-19 RX ADMIN — ONDANSETRON 4 MG: 2 INJECTION INTRAMUSCULAR; INTRAVENOUS at 19:38

## 2024-04-19 RX ADMIN — FAMOTIDINE 20 MG: 10 INJECTION, SOLUTION INTRAVENOUS at 19:38

## 2024-04-19 RX ADMIN — SODIUM CHLORIDE 1000 ML: 0.9 INJECTION, SOLUTION INTRAVENOUS at 19:38

## 2024-04-19 NOTE — PATIENT INSTRUCTIONS
Maintain hydration by drinking lots of fluids.  If feeling nauseous, take small steps every 5-10 minutes.  May supplement water with Pedialyte or watered down Gatorade.  Eat as tolerated.  Begin with clear liquids (Jell-O, water ice, broth, popsicles) and progress to BRAT diet (bananas, rice, applesauce and toast).  Advance diet as tolerated.  Use OTC Tylenol for fever.  You should begin to feel better and the next couple days.

## 2024-04-19 NOTE — PROGRESS NOTES
[facility]        NAME: Consuelo Perdomo is a 28 y.o. female  : 1995    MRN: [id number]  DATE: 2024  TIME: 6:39 PM    Assessment and Plan   Right upper quadrant abdominal pain [R10.11]  1. Right upper quadrant abdominal pain  Transfer to other facility        Discussed problem with patient.  No red flag signs on exam.  Benign tenderness on exam.  Discussed outpatient follow-up versus ER for further evaluation patient is opting to report to the ER.  Going to [facility] for further workup.    Patient Instructions       Follow up with PCP in 3-5 days.  Proceed to  ER if symptoms worsen.    If tests are performed, our office will contact you with results only if changes need to made to the care plan discussed with you at the visit. [facility]    Chief Complaint     Chief Complaint   Patient presents with   • Abdominal Pain     C/o pain in ruq radiating to scapular area. Feels tired; nauseated and intermittent h/a x 1 week         History of Present Illness       28-year-old female presents with several days of 5 out of 10 right upper quadrant abdominal pain that radiates to her scapula.  Waxing waning in severity as well as waxing waning nausea.  Following with GI for workup for IBS.  States it is worse after eating.  In no acute distress. Fever on Saturday, has not had once since. No tylenol or motrin today.     Abdominal Pain  Associated symptoms include nausea. Pertinent negatives include no constipation, diarrhea, fever, headaches, myalgias or vomiting.       Review of Systems   Review of Systems   Constitutional:  Positive for appetite change. Negative for chills, fatigue and fever.   Respiratory:  Negative for cough, shortness of breath, wheezing and stridor.    Cardiovascular:  Negative for chest pain and palpitations.   Gastrointestinal:  Positive for abdominal pain and nausea. Negative for constipation, diarrhea and vomiting.  "  Musculoskeletal:  Negative for myalgias.   Neurological:  Negative for dizziness, syncope, light-headedness and headaches.         Current Medications       Current Outpatient Medications:   •  acetaminophen (TYLENOL) 500 mg tablet, Take 500 mg by mouth every 6 (six) hours as needed for mild pain, Disp: , Rfl:     Current Allergies     Allergies as of 04/19/2024 - Reviewed 04/19/2024   Allergen Reaction Noted   • Nickel Rash 07/21/2023            The following portions of the patient's history were reviewed and updated as appropriate: allergies, current medications, past family history, past medical history, past social history, past surgical history and problem list.     Past Medical History:   Diagnosis Date   • Anxiety    • Inflammatory bowel disease    • Vertigo        Past Surgical History:   Procedure Laterality Date   • US GUIDED LYMPHNODE INJECTION  5/3/2019       Family History   Problem Relation Age of Onset   • Lupus Mother    • No Known Problems Father          Medications have been verified.        Objective   BP 98/78   Pulse 104   Temp 98 °F (36.7 °C)   Resp 16   Ht 5' 4\" (1.626 m)   Wt 49.9 kg (110 lb)   LMP 03/28/2024   SpO2 98%   BMI 18.88 kg/m²        Physical Exam     Physical Exam  Vitals and nursing note reviewed.   Constitutional:       General: She is not in acute distress.     Appearance: She is well-developed and normal weight. She is not ill-appearing, toxic-appearing or diaphoretic.   HENT:      Head: Normocephalic.      Mouth/Throat:      Mouth: Mucous membranes are moist.      Pharynx: Oropharynx is clear. No pharyngeal swelling or oropharyngeal exudate.   Eyes:      General: No scleral icterus.     Extraocular Movements: Extraocular movements intact.      Pupils: Pupils are equal, round, and reactive to light.   Cardiovascular:      Rate and Rhythm: Normal rate and regular rhythm.      Heart sounds: Normal heart sounds. No murmur heard.     No friction rub. No gallop. "   Pulmonary:      Effort: Pulmonary effort is normal. No respiratory distress.      Breath sounds: Normal breath sounds. No stridor. No wheezing, rhonchi or rales.   Chest:      Chest wall: No tenderness.   Abdominal:      General: Abdomen is flat. Bowel sounds are normal. There is no distension or abdominal bruit. There are no signs of injury.      Palpations: Abdomen is soft. There is no shifting dullness, fluid wave, hepatomegaly, splenomegaly, mass or pulsatile mass.      Tenderness: There is abdominal tenderness in the right upper quadrant. There is no right CVA tenderness, left CVA tenderness, guarding or rebound. Negative signs include Ramey's sign, Rovsing's sign, McBurney's sign, psoas sign and obturator sign.   Neurological:      Mental Status: She is alert.

## 2024-04-19 NOTE — Clinical Note
Consuelo Perdomo was seen and treated in our emergency department on 4/19/2024.                Diagnosis:     Consuelo  may return to work on return date.    She may return on this date: 04/22/2024         If you have any questions or concerns, please don't hesitate to call.      Nellie Rivera, DO    ______________________________           _______________          _______________  Hospital Representative                              Date                                Time

## 2024-04-19 NOTE — ED PROVIDER NOTES
History  Chief Complaint   Patient presents with    Abdominal Pain     Patient reports right upper quadrant pain for the past week with nausea, HA, and feeling more tired.     Patient is a 28-year-old female presenting to the emergency department from urgent care secondary to right upper quadrant abdominal pain, states is worse when she eats, reports a fever a few days ago but none since, no diarrhea, normal bowel movement yesterday, no vomiting, denies pregnancy, no dysuria or hematuria, symptoms have been present since Wednesday of last week        Prior to Admission Medications   Prescriptions Last Dose Informant Patient Reported? Taking?   acetaminophen (TYLENOL) 500 mg tablet   Yes No   Sig: Take 500 mg by mouth every 6 (six) hours as needed for mild pain      Facility-Administered Medications: None       Past Medical History:   Diagnosis Date    Anxiety     Inflammatory bowel disease     Vertigo        Past Surgical History:   Procedure Laterality Date    US GUIDED LYMPHNODE INJECTION  5/3/2019       Family History   Problem Relation Age of Onset    Lupus Mother     No Known Problems Father      I have reviewed and agree with the history as documented.    E-Cigarette/Vaping    E-Cigarette Use Never User      E-Cigarette/Vaping Substances     Social History     Tobacco Use    Smoking status: Every Day     Current packs/day: 1.00     Types: Cigarettes     Passive exposure: Current    Smokeless tobacco: Never   Vaping Use    Vaping status: Never Used   Substance Use Topics    Alcohol use: Not Currently     Comment: occas    Drug use: Never       Review of Systems   Constitutional:  Positive for fever.   HENT: Negative.     Eyes: Negative.    Respiratory: Negative.     Cardiovascular: Negative.    Gastrointestinal:  Positive for abdominal pain and nausea.   Endocrine: Negative.    Genitourinary: Negative.    Musculoskeletal: Negative.    Skin: Negative.    Allergic/Immunologic: Negative.    Neurological: Negative.     Hematological: Negative.    Psychiatric/Behavioral: Negative.         Physical Exam  Physical Exam  Constitutional:       Appearance: She is well-developed.   HENT:      Head: Normocephalic and atraumatic.   Eyes:      Pupils: Pupils are equal, round, and reactive to light.   Cardiovascular:      Rate and Rhythm: Normal rate and regular rhythm.   Pulmonary:      Effort: Pulmonary effort is normal.      Breath sounds: Normal breath sounds.   Abdominal:      General: Bowel sounds are normal.      Palpations: Abdomen is soft.      Tenderness: There is abdominal tenderness in the right upper quadrant.   Musculoskeletal:         General: Normal range of motion.      Cervical back: Normal range of motion and neck supple.   Skin:     General: Skin is warm.   Neurological:      Mental Status: She is alert.         Vital Signs  ED Triage Vitals [04/19/24 1908]   Temperature Pulse Respirations Blood Pressure SpO2   98 °F (36.7 °C) 68 16 108/65 99 %      Temp Source Heart Rate Source Patient Position - Orthostatic VS BP Location FiO2 (%)   Oral Monitor Sitting Left arm --      Pain Score       6           Vitals:    04/19/24 1908 04/19/24 1930   BP: 108/65 111/58   Pulse: 68 70   Patient Position - Orthostatic VS: Sitting          Visual Acuity      ED Medications  Medications   Famotidine (PF) (PEPCID) injection 20 mg (20 mg Intravenous Given 4/19/24 1938)   ondansetron (ZOFRAN) injection 4 mg (4 mg Intravenous Given 4/19/24 1938)   sodium chloride 0.9 % bolus 1,000 mL (0 mL Intravenous Stopped 4/19/24 2038)   iohexol (OMNIPAQUE) 350 MG/ML injection (SINGLE-DOSE) 85 mL (85 mL Intravenous Given 4/19/24 2006)       Diagnostic Studies  Results Reviewed       Procedure Component Value Units Date/Time    UA w Reflex to Microscopic w Reflex to Culture [[id number]] Collected: 04/19/24 1920    Lab Status: Final result Specimen: Urine, Clean Catch Updated: 04/19/24 1944     Color, UA Yellow     Clarity, UA Slightly Cloudy      Specific Gravity, UA 1.025     pH, UA 5.5     Leukocytes, UA Negative     Nitrite, UA Negative     Protein, UA Negative mg/dl      Glucose, UA Negative mg/dl      Ketones, UA Negative mg/dl      Urobilinogen, UA 0.2 E.U./dl      Bilirubin, UA Negative     Occult Blood, UA Negative    Lactic acid, plasma (w/reflex if result > 2.0) [[id number]]  (Normal) Collected: 04/19/24 1913    Lab Status: Final result Specimen: Blood from Arm, Right Updated: 04/19/24 1934     LACTIC ACID 1.1 mmol/L     Narrative:      Result may be elevated if tourniquet was used during collection.    Comprehensive metabolic panel [[id number]] Collected: 04/19/24 1913    Lab Status: Final result Specimen: Blood from Arm, Right Updated: 04/19/24 1934     Sodium 136 mmol/L      Potassium 3.6 mmol/L      Chloride 105 mmol/L      CO2 25 mmol/L      ANION GAP 6 mmol/L      BUN 16 mg/dL      Creatinine 0.60 mg/dL      Glucose 85 mg/dL      Calcium 9.0 mg/dL      AST 14 U/L      ALT 9 U/L      Alkaline Phosphatase 49 U/L      Total Protein 7.7 g/dL      Albumin 4.6 g/dL      Total Bilirubin 0.41 mg/dL      eGFR 122 ml/min/1.73sq m     Narrative:      National Kidney Disease Foundation guidelines for Chronic Kidney Disease (CKD):     Stage 1 with normal or high GFR (GFR > 90 mL/min/1.73 square meters)    Stage 2 Mild CKD (GFR = 60-89 mL/min/1.73 square meters)    Stage 3A Moderate CKD (GFR = 45-59 mL/min/1.73 square meters)    Stage 3B Moderate CKD (GFR = 30-44 mL/min/1.73 square meters)    Stage 4 Severe CKD (GFR = 15-29 mL/min/1.73 square meters)    Stage 5 End Stage CKD (GFR <15 mL/min/1.73 square meters)  Note: GFR calculation is accurate only with a steady state creatinine    Lipase [[id number]]  (Normal) Collected: 04/19/24 1913    Lab Status: Final result Specimen: Blood from Arm, Right Updated: 04/19/24 1934     Lipase 20 u/L     POCT pregnancy, urine [[id number]]  (Normal) Resulted: 04/19/24 1921    Lab Status: Final result Updated: 04/19/24 1921      EXT Preg Test, Ur Negative     Control Valid    CBC and differential [[id number]] Collected: 04/19/24 1913    Lab Status: Final result Specimen: Blood from Arm, Right Updated: 04/19/24 1917     WBC 6.21 Thousand/uL      RBC 4.33 Million/uL      Hemoglobin 13.2 g/dL      Hematocrit 40.2 %      MCV 93 fL      MCH 30.5 pg      MCHC 32.8 g/dL      RDW 12.2 %      MPV 9.7 fL      Platelets 351 Thousands/uL      nRBC 0 /100 WBCs      Segmented % 53 %      Immature Grans % 0 %      Lymphocytes % 36 %      Monocytes % 9 %      Eosinophils Relative 1 %      Basophils Relative 1 %      Absolute Neutrophils 3.30 Thousands/µL      Absolute Immature Grans 0.01 Thousand/uL      Absolute Lymphocytes 2.23 Thousands/µL      Absolute Monocytes 0.56 Thousand/µL      Eosinophils Absolute 0.08 Thousand/µL      Basophils Absolute 0.03 Thousands/µL                    CT abdomen pelvis with contrast   Final Result by Tawanda Walters MD (04/19 2023)      No acute findings in the abdomen or pelvis.               Workstation performed: [id number]                    Procedures  Procedures         ED Course                                             Medical Decision Making  Abdominal exam without peritoneal signs.  No evidence of acute abdomen at this time.  Well-appearing.  Given work-up, low suspicion for acute hepatobiliary disease (including acute cholecystitis or cholangitis), acute pancreatitis (negative lipase), PUD (including gastric perforation), acute infectious processes (pneumonia, hepatitis, pyelonephritis), acute appendicitis, vascular catastrophe, bowel obstruction, viscus perforation, ovarian/testicular torsion, or diverticulitis.  Presentation not consistent with other acute emergent causes of abdominal pain at this time.    Problems Addressed:  Right upper quadrant abdominal pain: acute illness or injury    Amount and/or Complexity of Data Reviewed  Labs: ordered. Decision-making details documented in ED  Course.  Radiology: ordered. Decision-making details documented in ED Course.    Risk  OTC drugs.  Prescription drug management.             Disposition  Final diagnoses:   Right upper quadrant abdominal pain     Time reflects when diagnosis was documented in both MDM as applicable and the Disposition within this note       Time User Action Codes Description Comment    4/19/2024  8:49 PM Nellie Rivera Add [R10.11] Right upper quadrant abdominal pain           ED Disposition       ED Disposition   Discharge    Condition   Stable    Date/Time   Fri Apr 19, 2024  8:49 PM    Comment   Consuelo Perdomo discharge to home/self care.                   Follow-up Information       Follow up With Specialties Details Why Contact Info    Amari Houston DO Internal Medicine  As needed [address] [facility] [address]  [phone]              Discharge Medication List as of 4/19/2024  8:49 PM        START taking these medications    Details   famotidine (PEPCID) 20 mg tablet Take 1 tablet (20 mg total) by mouth 2 (two) times a day, Starting Fri 4/19/2024, Normal           CONTINUE these medications which have NOT CHANGED    Details   acetaminophen (TYLENOL) 500 mg tablet Take 500 mg by mouth every 6 (six) hours as needed for mild pain, Historical Med             No discharge procedures on file.    PDMP Review       None            ED Provider  Electronically Signed by             Nellie Rivera DO  04/19/24 7420

## 2024-04-23 ENCOUNTER — HOSPITAL ENCOUNTER (OUTPATIENT)
Dept: ULTRASOUND IMAGING | Facility: HOSPITAL | Age: 29
Discharge: HOME | End: 2024-04-23
Payer: COMMERCIAL

## 2024-04-23 DIAGNOSIS — R11.0 NAUSEA: ICD-10-CM

## 2024-04-23 DIAGNOSIS — R10.11 RIGHT UPPER QUADRANT PAIN: ICD-10-CM

## 2024-04-23 PROCEDURE — 76705 ECHO EXAM OF ABDOMEN: CPT

## 2024-08-27 ENCOUNTER — OFFICE VISIT (OUTPATIENT)
Dept: URGENT CARE | Facility: CLINIC | Age: 29
End: 2024-08-27
Payer: COMMERCIAL

## 2024-08-27 ENCOUNTER — APPOINTMENT (OUTPATIENT)
Dept: URGENT CARE | Facility: CLINIC | Age: 29
End: 2024-08-27
Payer: COMMERCIAL

## 2024-08-27 VITALS
DIASTOLIC BLOOD PRESSURE: 62 MMHG | SYSTOLIC BLOOD PRESSURE: 118 MMHG | WEIGHT: 110 LBS | HEART RATE: 67 BPM | OXYGEN SATURATION: 99 % | BODY MASS INDEX: 18.78 KG/M2 | TEMPERATURE: 98.4 F | RESPIRATION RATE: 17 BRPM | HEIGHT: 64 IN

## 2024-08-27 DIAGNOSIS — J06.9 VIRAL URI WITH COUGH: Primary | ICD-10-CM

## 2024-08-27 PROCEDURE — S9088 SERVICES PROVIDED IN URGENT: HCPCS

## 2024-08-27 PROCEDURE — 99213 OFFICE O/P EST LOW 20 MIN: CPT

## 2024-08-27 NOTE — PATIENT INSTRUCTIONS
"Continue with supportive measures, OTC Tylenol/Ibuprofen, nasal decongestants, and cough suppressants   Cool mist humidifiers, throat lozenges, increased fluid intake and rest   Flonase nasal spray and OTC Claritin/Zyrtec   Follow up with PCP in 3-5 days  Present to ER if symptoms worsen       If tests have been performed at [facility], our office will contact you with results if changes need to be made to the care plan discussed with you at the visit.  You can review your full results on [facility]'s MyChart.    Patient Education     Cough, runny nose, and the common cold   The Basics   Written by the doctors and editors at [facility]   What causes cough, runny nose, and other symptoms of the common cold? -- These symptoms are usually caused by a virus. Doctors also use the term \"viral upper respiratory infection\" or \"viral URI.\" Lots of different viruses can get into your nose, mouth, throat, or airways and cause cold symptoms.  Most people get better from a cold without any lasting problems. Even so, having a cold can be uncomfortable.  What are the symptoms of the common cold? -- Symptoms can include:   Sneezing   Coughing   Sniffling and runny nose   Sore throat   Chest congestion  In children, the common cold can also cause a fever. But adults do not usually get a fever when they have a cold.  Colds usually last about 3 to 7 days in adults and 10 days in children. But some people have symptoms for up to 2 weeks.  How can I tell if I have a cold or something else? -- Sometimes, it can be hard to tell if you have a cold or something else. Some cold symptoms can also be caused by other illnesses, such as COVID-19, the flu, or strep throat.  There are sometimes clues that can help you tell the difference:   COVID-19 often starts out very similar to a cold, although it can also cause a fever. If you have cold symptoms and have been around someone with COVID-19, you should get a test to find out if you have it, too.   The " flu is more likely to cause fever, body aches, and extreme tiredness than a cold.   Strep throat usually causes severe throat pain. It can also cause a fever and swollen glands in the neck. People with strep throat usually do not have other cold symptoms like a stuffy nose or cough.  If you think that you might have an illness other than the common cold, call your doctor or nurse. They can tell you what to do.  Can medicine help with a cold? -- Usually, a cold gets better on its own and does not need treatment. Because colds are usually caused by viruses, antibiotics will not help.  If you are a teen or an adult, you can try cough and cold medicines that you can get without a prescription. These medicines might help with your symptoms. But they can't cure your cold, or help you get well faster.  If you decide to try non-prescription cold medicines:   Read the directions on the label, and follow them carefully.   Do not combine 2 or more medicines that have acetaminophen in them. If you take too much acetaminophen, it can damage your liver.   If you have a heart condition, have high blood pressure, or take any prescription medicines, talk to your doctor or pharmacist before taking cold medicine. They can tell you which medicines are safe.  Some medicines are not safe for children:   If your child is younger than 6, do not give them any cold medicines. These medicines are not safe for young children. Even if your child is older than 6, cough and cold medicines are unlikely to help.   Never give aspirin to any child younger than 18 years old. In children, aspirin can cause a life-threatening condition called Reye syndrome.   When giving your child acetaminophen or other non-prescription medicines, never give more than the recommended dose.  Is there anything I can do on my own to feel better? -- Yes. You can:   Get plenty of rest.   Drink lots of fluids (water, juice, or broth) to stay hydrated. This will help replace  any fluids lost if you have a runny nose or sweating from a fever. Warm tea or soup can help soothe a sore throat.   If the air in your home feels dry, use a cool-mist humidifier. This can help a stuffy nose and make it easier to breathe.   Use saline nose drops or spray to relieve stuffiness.   Avoid smoking, and stay away from places where people are smoking.  Can the common cold lead to more serious problems? -- In some cases, yes. In some people, having a cold can lead to:   Ear infections   Worse asthma symptoms   Sinus infections   Pneumonia or bronchitis (infections of the lungs)  Can colds be prevented? -- There are some things you can do to keep germs from spreading:   Wash your hands with soap and water often (figure 1) - This can also help prevent the spread of other illnesses like the flu and COVID-19.   Cover your cough - Cough into your elbow instead of your hands. Teach children to do this, too. Throw away used tissues right away.   Clean surfaces - The germs that cause the common cold can live on tables, door handles, and other surfaces for at least 2 hours.   Stay home if you are sick - When you do need to be around other people, consider wearing a face mask until you are feeling better.  When should I call the doctor? -- Contact your doctor or nurse if you:   Lose your sense of taste or smell   Have a fever of more than 100.4°F (38°C) that comes with shaking chills, loss of appetite, or trouble breathing   Have a very bad sore throat   Have a fever and also have lung disease, such as emphysema or asthma   Have a cough that lasts longer than 10 days or starts getting worse   Have chest pain when you cough or breathe deeply, have trouble breathing, or cough up blood  If you are older than 65, or if you have any chronic medical conditions such as diabetes, contact your doctor or nurse any time you get a long-lasting cough.  Take your child to the emergency department if they:   Become confused or stop  responding to you   Have trouble breathing or have to work hard to breathe  Contact your child's doctor or nurse if the child:   Loses their sense of taste or smell or won't eat foods that they ate before   Has a very bad sore throat   Refuses to drink anything for a long time   Is younger than 4 months   Has a fever and is not acting like themselves   Has a cough that lasts for more than 2 weeks and is not getting any better or is getting worse   Has a stuffed or runny nose that gets worse or does not get any better after 10 days   Has red eyes or yellow goop coming out of their eyes   Has ear pain, pulls at their ears, or shows other signs of having an ear infection  All topics are updated as new evidence becomes available and our peer review process is complete.  This topic retrieved from [employer] on: Feb 26, 2024.  Topic 24817 Version 30.0  Release: 32.2.4 - C32.56  © 2024 UpToDate, Inc. and/or its affiliates. All rights reserved.  figure 1: How to wash your hands     Wet your hands with clean water, and apply a small amount of soap. Lather and rub hands together for at least 20 seconds. Clean your wrists, palms, backs of your hands, between your fingers, tips of your fingers, thumbs, and under and around your nails. Rinse well, and dry your hands using a clean towel.  Graphic 231871 Version 7.0  Consumer Information Use and Disclaimer   Disclaimer: This generalized information is a limited summary of diagnosis, treatment, and/or medication information. It is not meant to be comprehensive and should be used as a tool to help the user understand and/or assess potential diagnostic and treatment options. It does NOT include all information about conditions, treatments, medications, side effects, or risks that may apply to a specific patient. It is not intended to be medical advice or a substitute for the medical advice, diagnosis, or treatment of a health care provider based on the health care provider's  examination and assessment of a patient's specific and unique circumstances. Patients must speak with a health care provider for complete information about their health, medical questions, and treatment options, including any risks or benefits regarding use of medications. This information does not endorse any treatments or medications as safe, effective, or approved for treating a specific patient. UpToDate, Inc. and its affiliates disclaim any warranty or liability relating to this information or the use thereof.The use of this information is governed by the Terms of Use, available at https://www.[employer].com/en/know/clinical-effectiveness-terms. 2024© UpToDate, Inc. and its affiliates and/or licensors. All rights reserved.  Copyright   © 2024 UpToDate, Inc. and/or its affiliates. All rights reserved.

## 2024-08-27 NOTE — PROGRESS NOTES
"  [facility]        NAME: Consuelo Perdomo is a 29 y.o. female  : 1995    MRN: [id number]  DATE: 2024  TIME: 8:45 AM    Assessment and Plan   Viral URI with cough [J06.9]  1. Viral URI with cough          Given symptom duration x4 days, likely viral etiology and encouraged continued supportive measures.  Follow up with PCP in 3-5 days or proceed to emergency department for worsening symptoms.  Patient verbalized understanding of instructions given.       Patient Instructions     Patient Instructions   Continue with supportive measures, OTC Tylenol/Ibuprofen, nasal decongestants, and cough suppressants   Cool mist humidifiers, throat lozenges, increased fluid intake and rest   Flonase nasal spray and OTC Claritin/Zyrtec   Follow up with PCP in 3-5 days  Present to ER if symptoms worsen       If tests have been performed at [facility], our office will contact you with results if changes need to be made to the care plan discussed with you at the visit.  You can [facility] on [facility].    Patient Education     Cough, runny nose, and the common cold   The Basics   Written by the doctors and editors at [facility]   What causes cough, runny nose, and other symptoms of the common cold? -- These symptoms are usually caused by a virus. Doctors also use the term \"viral upper respiratory infection\" or \"viral URI.\" Lots of different viruses can get into your nose, mouth, throat, or airways and cause cold symptoms.  Most people get better from a cold without any lasting problems. Even so, having a cold can be uncomfortable.  What are the symptoms of the common cold? -- Symptoms can include:   Sneezing   Coughing   Sniffling and runny nose   Sore throat   Chest congestion  In children, the common cold can also cause a fever. But adults do not usually get a fever when they have a cold.  Colds usually last about 3 to 7 days in adults and 10 days in children. But some people have " symptoms for up to 2 weeks.  How can I tell if I have a cold or something else? -- Sometimes, it can be hard to tell if you have a cold or something else. Some cold symptoms can also be caused by other illnesses, such as COVID-19, the flu, or strep throat.  There are sometimes clues that can help you tell the difference:   COVID-19 often starts out very similar to a cold, although it can also cause a fever. If you have cold symptoms and have been around someone with COVID-19, you should get a test to find out if you have it, too.   The flu is more likely to cause fever, body aches, and extreme tiredness than a cold.   Strep throat usually causes severe throat pain. It can also cause a fever and swollen glands in the neck. People with strep throat usually do not have other cold symptoms like a stuffy nose or cough.  If you think that you might have an illness other than the common cold, call your doctor or nurse. They can tell you what to do.  Can medicine help with a cold? -- Usually, a cold gets better on its own and does not need treatment. Because colds are usually caused by viruses, antibiotics will not help.  If you are a teen or an adult, you can try cough and cold medicines that you can get without a prescription. These medicines might help with your symptoms. But they can't cure your cold, or help you get well faster.  If you decide to try non-prescription cold medicines:   Read the directions on the label, and follow them carefully.   Do not combine 2 or more medicines that have acetaminophen in them. If you take too much acetaminophen, it can damage your liver.   If you have a heart condition, have high blood pressure, or take any prescription medicines, talk to your doctor or pharmacist before taking cold medicine. They can tell you which medicines are safe.  Some medicines are not safe for children:   If your child is younger than 6, do not give them any cold medicines. These medicines are not safe for  young children. Even if your child is older than 6, cough and cold medicines are unlikely to help.   Never give aspirin to any child younger than 18 years old. In children, aspirin can cause a life-threatening condition called Reye syndrome.   When giving your child acetaminophen or other non-prescription medicines, never give more than the recommended dose.  Is there anything I can do on my own to feel better? -- Yes. You can:   Get plenty of rest.   Drink lots of fluids (water, juice, or broth) to stay hydrated. This will help replace any fluids lost if you have a runny nose or sweating from a fever. Warm tea or soup can help soothe a sore throat.   If the air in your home feels dry, use a cool-mist humidifier. This can help a stuffy nose and make it easier to breathe.   Use saline nose drops or spray to relieve stuffiness.   Avoid smoking, and stay away from places where people are smoking.  Can the common cold lead to more serious problems? -- In some cases, yes. In some people, having a cold can lead to:   Ear infections   Worse asthma symptoms   Sinus infections   Pneumonia or bronchitis (infections of the lungs)  Can colds be prevented? -- There are some things you can do to keep germs from spreading:   Wash your hands with soap and water often (figure 1) - This can also help prevent the spread of other illnesses like the flu and COVID-19.   Cover your cough - Cough into your elbow instead of your hands. Teach children to do this, too. Throw away used tissues right away.   Clean surfaces - The germs that cause the common cold can live on tables, door handles, and other surfaces for at least 2 hours.   Stay home if you are sick - When you do need to be around other people, consider wearing a face mask until you are feeling better.  When should I call the doctor? -- Contact your doctor or nurse if you:   Lose your sense of taste or smell   Have a fever of more than 100.4°F (38°C) that comes with shaking chills,  loss of appetite, or trouble breathing   Have a very bad sore throat   Have a fever and also have lung disease, such as emphysema or asthma   Have a cough that lasts longer than 10 days or starts getting worse   Have chest pain when you cough or breathe deeply, have trouble breathing, or cough up blood  If you are older than 65, or if you have any chronic medical conditions such as diabetes, contact your doctor or nurse any time you get a long-lasting cough.  Take your child to the emergency department if they:   Become confused or stop responding to you   Have trouble breathing or have to work hard to breathe  Contact your child's doctor or nurse if the child:   Loses their sense of taste or smell or won't eat foods that they ate before   Has a very bad sore throat   Refuses to drink anything for a long time   Is younger than 4 months   Has a fever and is not acting like themselves   Has a cough that lasts for more than 2 weeks and is not getting any better or is getting worse   Has a stuffed or runny nose that gets worse or does not get any better after 10 days   Has red eyes or yellow goop coming out of their eyes   Has ear pain, pulls at their ears, or shows other signs of having an ear infection  All topics are updated as new evidence becomes available and our peer review process is complete.  This topic retrieved from [employer] on: Feb 26, 2024.  Topic 06788 Version 30.0  Release: 32.2.4 - C32.56  © 2024 UpToDate, Inc. and/or its affiliates. All rights reserved.  figure 1: How to wash your hands     Wet your hands with clean water, and apply a small amount of soap. Lather and rub hands together for at least 20 seconds. Clean your wrists, palms, backs of your hands, between your fingers, tips of your fingers, thumbs, and under and around your nails. Rinse well, and dry your hands using a clean towel.  Graphic 596763 Version 7.0  Consumer Information Use and Disclaimer   Disclaimer: This generalized information  is a limited summary of diagnosis, treatment, and/or medication information. It is not meant to be comprehensive and should be used as a tool to help the user understand and/or assess potential diagnostic and treatment options. It does NOT include all information about conditions, treatments, medications, side effects, or risks that may apply to a specific patient. It is not intended to be medical advice or a substitute for the medical advice, diagnosis, or treatment of a health care provider based on the health care provider's examination and assessment of a patient's specific and unique circumstances. Patients must speak with a health care provider for complete information about their health, medical questions, and treatment options, including any risks or benefits regarding use of medications. This information does not endorse any treatments or medications as safe, effective, or approved for treating a specific patient. Up[employer]Date, Inc. and its affiliates disclaim any warranty or liability relating to this information or the use thereof.The use of this information is governed by the Terms of Use, available at https://www.[employer].com/en/know/clinical-effectiveness-terms. 2024© UpToDate, Inc. and its affiliates and/or licensors. All rights reserved.  Copyright   © 2024 Up[employer]Date, Inc. and/or its affiliates. All rights reserved.        Chief Complaint     Chief Complaint   Patient presents with    Cough     Cough headache, watery eyes, nasal congestion X4 days. Took Zyrtec and dayquil without relief         History of Present Illness       29-year-old female with no significant past medical history presents with complaints of headache, watery eyes, nasal congestion, sore throat, postnasal drip, and cough x 4 days.  Denies fever, chills, vomiting, or diarrhea.  Denies known sick contacts or exposures.  She has been taking OTC Zyrtec as well as DayQuil with little relief. Requesting work note.         Review of  "Systems   Review of Systems   Constitutional:  Negative for chills and fever.   HENT:  Positive for congestion, postnasal drip, rhinorrhea and sore throat. Negative for ear discharge, ear pain, trouble swallowing and voice change.    Eyes:  Positive for discharge. Negative for redness.   Respiratory:  Positive for cough. Negative for shortness of breath and wheezing.    Cardiovascular:  Negative for chest pain.   Gastrointestinal:  Negative for abdominal pain, diarrhea, nausea and vomiting.   Skin:  Negative for rash.   Neurological:  Positive for headaches.         Current Medications       Current Outpatient Medications:     acetaminophen (TYLENOL) 500 mg tablet, Take 500 mg by mouth every 6 (six) hours as needed for mild pain, Disp: , Rfl:     famotidine (PEPCID) 20 mg tablet, Take 1 tablet (20 mg total) by mouth 2 (two) times a day, Disp: 30 tablet, Rfl: 0    Current Allergies     Allergies as of 08/27/2024 - Reviewed 08/27/2024   Allergen Reaction Noted    Nickel Rash 07/21/2023            The following portions of the patient's history were reviewed and updated as appropriate: allergies, current medications, past family history, past medical history, past social history, past surgical history and problem list.     Past Medical History:   Diagnosis Date    Anxiety     Inflammatory bowel disease     Vertigo        Past Surgical History:   Procedure Laterality Date    US GUIDED LYMPHNODE INJECTION  5/3/2019       Family History   Problem Relation Age of Onset    Lupus Mother     No Known Problems Father          Medications have been verified.        Objective   /62   Pulse 67   Temp 98.4 °F (36.9 °C)   Resp 17   Ht 5' 4\" (1.626 m)   Wt 49.9 kg (110 lb)   LMP 08/20/2024 (Approximate)   SpO2 99%   BMI 18.88 kg/m²   Patient's last menstrual period was 08/20/2024 (approximate).       Physical Exam     Physical Exam  Vitals and nursing note reviewed.   Constitutional:       General: She is not in acute " distress.     Appearance: She is not toxic-appearing.   HENT:      Head: Normocephalic.      Right Ear: Tympanic membrane, ear canal and external ear normal.      Left Ear: Tympanic membrane, ear canal and external ear normal.      Nose: Congestion present.      Mouth/Throat:      Mouth: Mucous membranes are moist.      Pharynx: Posterior oropharyngeal erythema present.      Tonsils: No tonsillar exudate. 0 on the right. 0 on the left.      Comments: Very mildly erythematous pharynx with mucoid drainage (PND)   Eyes:      General:         Right eye: No discharge.         Left eye: No discharge.      Conjunctiva/sclera: Conjunctivae normal.   Cardiovascular:      Rate and Rhythm: Normal rate and regular rhythm.      Heart sounds: Normal heart sounds.   Pulmonary:      Effort: Pulmonary effort is normal. No respiratory distress.      Breath sounds: Normal breath sounds. No stridor. No wheezing, rhonchi or rales.   Lymphadenopathy:      Cervical: No cervical adenopathy.   Skin:     General: Skin is warm and dry.   Neurological:      Mental Status: She is alert and oriented to person, place, and time.      Gait: Gait is intact.   Psychiatric:         Mood and Affect: Mood normal.         Behavior: Behavior normal.

## 2024-08-27 NOTE — LETTER
August 27, 2024     Patient: Consuelo Perdomo   YOB: 1995   Date of Visit: 8/27/2024       To Whom it May Concern:    Consuelo Perdomo was seen in my clinic on 8/27/2024. She may return to work on 8/28/2024 .    If you have any questions or concerns, please don't hesitate to call.         Sincerely,          ERIK Newsome        CC: No Recipients

## 2024-09-30 ENCOUNTER — OFFICE VISIT (OUTPATIENT)
Dept: URGENT CARE | Facility: CLINIC | Age: 29
End: 2024-09-30
Payer: COMMERCIAL

## 2024-09-30 VITALS
WEIGHT: 113 LBS | HEART RATE: 112 BPM | OXYGEN SATURATION: 99 % | DIASTOLIC BLOOD PRESSURE: 89 MMHG | TEMPERATURE: 98.6 F | BODY MASS INDEX: 20.02 KG/M2 | HEIGHT: 63 IN | SYSTOLIC BLOOD PRESSURE: 112 MMHG | RESPIRATION RATE: 18 BRPM

## 2024-09-30 DIAGNOSIS — B34.9 VIRAL SYNDROME: Primary | ICD-10-CM

## 2024-09-30 LAB
SARS-COV-2 AG UPPER RESP QL IA: NEGATIVE
VALID CONTROL: NORMAL

## 2024-09-30 PROCEDURE — 87811 SARS-COV-2 COVID19 W/OPTIC: CPT

## 2024-09-30 PROCEDURE — S9088 SERVICES PROVIDED IN URGENT: HCPCS

## 2024-09-30 PROCEDURE — 99213 OFFICE O/P EST LOW 20 MIN: CPT

## 2024-09-30 NOTE — PROGRESS NOTES
"  [facility]        NAME: Consuelo Perdomo is a 29 y.o. female  : 1995    MRN: [id number]  DATE: 2024  TIME: 5:00 PM    Assessment and Plan   Viral syndrome [B34.9]  1. Viral syndrome  Poct Covid 19 Rapid Antigen Test        Rapid POC COVID testing negative. Given symptom duration x2-3 days, likely viral etiology. Encouraged continued supportive measures.  Follow up with PCP in 3-5 days or proceed to emergency department for worsening symptoms.  Patient verbalized understanding of instructions given.       Patient Instructions     Patient Instructions   Rapid POC COVID testing negative  Continue with supportive measures, OTC Tylenol/Ibuprofen, nasal decongestants, and cough suppressants   Cool mist humidifiers, throat lozenges, increased fluid intake and rest   Follow up with PCP in 3-5 days  Present to ER if symptoms worsen       If tests have been performed at [facility], our office will contact you with results if changes need to be made to the care plan discussed with you at the visit.  You can review your full results on [facility] MyChart.      Patient Education     Cough, runny nose, and the common cold   The Basics   Written by the doctors and editors at [facility]   What causes cough, runny nose, and other symptoms of the common cold? -- These symptoms are usually caused by a virus. Doctors also use the term \"viral upper respiratory infection\" or \"viral URI.\" Lots of different viruses can get into your nose, mouth, throat, or airways and cause cold symptoms.  Most people get better from a cold without any lasting problems. Even so, having a cold can be uncomfortable.  What are the symptoms of the common cold? -- Symptoms can include:   Sneezing   Coughing   Sniffling and runny nose   Sore throat   Chest congestion  In children, the common cold can also cause a fever. But adults do not usually get a fever when they have a cold.  Colds usually last about 3 to 7 days in adults and " 10 days in children. But some people have symptoms for up to 2 weeks.  How can I tell if I have a cold or something else? -- Sometimes, it can be hard to tell if you have a cold or something else. Some cold symptoms can also be caused by other illnesses, such as COVID-19, the flu, or strep throat.  There are sometimes clues that can help you tell the difference:   COVID-19 often starts out very similar to a cold, although it can also cause a fever. If you have cold symptoms and have been around someone with COVID-19, you should get a test to find out if you have it, too.   The flu is more likely to cause fever, body aches, and extreme tiredness than a cold.   Strep throat usually causes severe throat pain. It can also cause a fever and swollen glands in the neck. People with strep throat usually do not have other cold symptoms like a stuffy nose or cough.  If you think that you might have an illness other than the common cold, call your doctor or nurse. They can tell you what to do.  Can medicine help with a cold? -- Usually, a cold gets better on its own and does not need treatment. Because colds are usually caused by viruses, antibiotics will not help.  If you are a teen or an adult, you can try cough and cold medicines that you can get without a prescription. These medicines might help with your symptoms. But they can't cure your cold, or help you get well faster.  If you decide to try non-prescription cold medicines:   Read the directions on the label, and follow them carefully.   Do not combine 2 or more medicines that have acetaminophen in them. If you take too much acetaminophen, it can damage your liver.   If you have a heart condition, have high blood pressure, or take any prescription medicines, talk to your doctor or pharmacist before taking cold medicine. They can tell you which medicines are safe.  Some medicines are not safe for children:   If your child is younger than 6, do not give them any cold  medicines. These medicines are not safe for young children. Even if your child is older than 6, cough and cold medicines are unlikely to help.   Never give aspirin to any child younger than 18 years old. In children, aspirin can cause a life-threatening condition called Reye syndrome.   When giving your child acetaminophen or other non-prescription medicines, never give more than the recommended dose.  Is there anything I can do on my own to feel better? -- Yes. You can:   Get plenty of rest.   Drink lots of fluids (water, juice, or broth) to stay hydrated. This will help replace any fluids lost if you have a runny nose or sweating from a fever. Warm tea or soup can help soothe a sore throat.   If the air in your home feels dry, use a cool-mist humidifier. This can help a stuffy nose and make it easier to breathe.   Use saline nose drops or spray to relieve stuffiness.   Avoid smoking, and stay away from places where people are smoking.  Can the common cold lead to more serious problems? -- In some cases, yes. In some people, having a cold can lead to:   Ear infections   Worse asthma symptoms   Sinus infections   Pneumonia or bronchitis (infections of the lungs)  Can colds be prevented? -- There are some things you can do to keep germs from spreading:   Wash your hands with soap and water often (figure 1) - This can also help prevent the spread of other illnesses like the flu and COVID-19.   Cover your cough - Cough into your elbow instead of your hands. Teach children to do this, too. Throw away used tissues right away.   Clean surfaces - The germs that cause the common cold can live on tables, door handles, and other surfaces for at least 2 hours.   Stay home if you are sick - When you do need to be around other people, consider wearing a face mask until you are feeling better.  When should I call the doctor? -- Contact your doctor or nurse if you:   Lose your sense of taste or smell   Have a fever of more than  100.4°F (38°C) that comes with shaking chills, loss of appetite, or trouble breathing   Have a very bad sore throat   Have a fever and also have lung disease, such as emphysema or asthma   Have a cough that lasts longer than 10 days or starts getting worse   Have chest pain when you cough or breathe deeply, have trouble breathing, or cough up blood  If you are older than 65, or if you have any chronic medical conditions such as diabetes, contact your doctor or nurse any time you get a long-lasting cough.  Take your child to the emergency department if they:   Become confused or stop responding to you   Have trouble breathing or have to work hard to breathe  Contact your child's doctor or nurse if the child:   Loses their sense of taste or smell or won't eat foods that they ate before   Has a very bad sore throat   Refuses to drink anything for a long time   Is younger than 4 months   Has a fever and is not acting like themselves   Has a cough that lasts for more than 2 weeks and is not getting any better or is getting worse   Has a stuffed or runny nose that gets worse or does not get any better after 10 days   Has red eyes or yellow goop coming out of their eyes   Has ear pain, pulls at their ears, or shows other signs of having an ear infection  All topics are updated as new evidence becomes available and our peer review process is complete.  This topic retrieved from [employer] on: Feb 26, 2024.  Topic 12298 Version 30.0  Release: 32.2.4 - C32.56  © 2024 UpToDate, Inc. and/or its affiliates. All rights reserved.  figure 1: How to wash your hands     Wet your hands with clean water, and apply a small amount of soap. Lather and rub hands together for at least 20 seconds. Clean your wrists, palms, backs of your hands, between your fingers, tips of your fingers, thumbs, and under and around your nails. Rinse well, and dry your hands using a clean towel.  Graphic 462528 Version 7.0  Consumer Information Use and  Disclaimer   Disclaimer: This generalized information is a limited summary of diagnosis, treatment, and/or medication information. It is not meant to be comprehensive and should be used as a tool to help the user understand and/or assess potential diagnostic and treatment options. It does NOT include all information about conditions, treatments, medications, side effects, or risks that may apply to a specific patient. It is not intended to be medical advice or a substitute for the medical advice, diagnosis, or treatment of a health care provider based on the health care provider's examination and assessment of a patient's specific and unique circumstances. Patients must speak with a health care provider for complete information about their health, medical questions, and treatment options, including any risks or benefits regarding use of medications. This information does not endorse any treatments or medications as safe, effective, or approved for treating a specific patient. UpToDate, Inc. and its affiliates disclaim any warranty or liability relating to this information or the use thereof.The use of this information is governed by the Terms of Use, available at https://www.[employer].com/en/know/clinical-effectiveness-terms. 2024© UpToDate, Inc. and its affiliates and/or licensors. All rights reserved.  Copyright   © 2024 UpToDate, Inc. and/or its affiliates. All rights reserved.        Chief Complaint     Chief Complaint   Patient presents with    Nasal Congestion     Started Saturday with fatigue runny nose and body aches and low grade fever          History of Present Illness       29-year-old female with no significant past medical history presents with complaints of low-grade fever, nasal congestion, cough, and bodyaches x 3 days.  Tmax 99.  Some nausea but no vomiting or diarrhea.  Denies known sick contacts or exposures.        Review of Systems   Review of Systems   Constitutional:  Positive for fever.  "Negative for chills.   HENT:  Positive for congestion and rhinorrhea. Negative for ear discharge, ear pain, sore throat, trouble swallowing and voice change.    Eyes:  Negative for discharge.   Respiratory:  Positive for cough. Negative for shortness of breath and wheezing.    Cardiovascular:  Negative for chest pain.   Gastrointestinal:  Positive for nausea. Negative for abdominal pain, diarrhea and vomiting.   Musculoskeletal:  Positive for myalgias.   Skin:  Negative for rash.         Current Medications       Current Outpatient Medications:     acetaminophen (TYLENOL) 500 mg tablet, Take 500 mg by mouth every 6 (six) hours as needed for mild pain, Disp: , Rfl:     famotidine (PEPCID) 20 mg tablet, Take 1 tablet (20 mg total) by mouth 2 (two) times a day, Disp: 30 tablet, Rfl: 0    Current Allergies     Allergies as of 09/30/2024 - Reviewed 09/30/2024   Allergen Reaction Noted    Nickel Rash 07/21/2023            The following portions of the patient's history were reviewed and updated as appropriate: allergies, current medications, past family history, past medical history, past social history, past surgical history and problem list.     Past Medical History:   Diagnosis Date    Anxiety     Inflammatory bowel disease     Vertigo        Past Surgical History:   Procedure Laterality Date    US GUIDED LYMPHNODE INJECTION  5/3/2019       Family History   Problem Relation Age of Onset    Lupus Mother     No Known Problems Father          Medications have been verified.        Objective   /89   Pulse (!) 112   Temp 98.6 °F (37 °C)   Resp 18   Ht 5' 3\" (1.6 m)   Wt 51.3 kg (113 lb)   LMP 09/29/2024   SpO2 99%   BMI 20.02 kg/m²   Patient's last menstrual period was 09/29/2024.       Physical Exam     Physical Exam  Vitals and nursing note reviewed.   Constitutional:       General: She is not in acute distress.     Appearance: She is not toxic-appearing.   HENT:      Head: Normocephalic.      Right Ear: " Tympanic membrane, ear canal and external ear normal.      Left Ear: Tympanic membrane, ear canal and external ear normal.      Nose: Congestion present.      Mouth/Throat:      Mouth: Mucous membranes are moist.      Pharynx: Oropharynx is clear.   Eyes:      Conjunctiva/sclera: Conjunctivae normal.   Cardiovascular:      Rate and Rhythm: Normal rate and regular rhythm.      Heart sounds: Normal heart sounds.   Pulmonary:      Effort: Pulmonary effort is normal. No respiratory distress.      Breath sounds: Normal breath sounds. No stridor. No wheezing, rhonchi or rales.   Lymphadenopathy:      Cervical: No cervical adenopathy.   Skin:     General: Skin is warm and dry.   Neurological:      Mental Status: She is alert and oriented to person, place, and time.      Gait: Gait is intact.   Psychiatric:         Mood and Affect: Mood normal.         Behavior: Behavior normal.

## 2024-09-30 NOTE — PATIENT INSTRUCTIONS
"Rapid POC COVID testing negative  Continue with supportive measures, OTC Tylenol/Ibuprofen, nasal decongestants, and cough suppressants   Cool mist humidifiers, throat lozenges, increased fluid intake and rest   Follow up with PCP in 3-5 days  Present to ER if symptoms worsen       If tests have been performed at [facility], our office will contact you with results if changes need to be made to the care plan discussed with you at the visit.  You can review your full results on [facility] MyChart.      Patient Education     Cough, runny nose, and the common cold   The Basics   Written by the doctors and editors at [facility]   What causes cough, runny nose, and other symptoms of the common cold? -- These symptoms are usually caused by a virus. Doctors also use the term \"viral upper respiratory infection\" or \"viral URI.\" Lots of different viruses can get into your nose, mouth, throat, or airways and cause cold symptoms.  Most people get better from a cold without any lasting problems. Even so, having a cold can be uncomfortable.  What are the symptoms of the common cold? -- Symptoms can include:   Sneezing   Coughing   Sniffling and runny nose   Sore throat   Chest congestion  In children, the common cold can also cause a fever. But adults do not usually get a fever when they have a cold.  Colds usually last about 3 to 7 days in adults and 10 days in children. But some people have symptoms for up to 2 weeks.  How can I tell if I have a cold or something else? -- Sometimes, it can be hard to tell if you have a cold or something else. Some cold symptoms can also be caused by other illnesses, such as COVID-19, the flu, or strep throat.  There are sometimes clues that can help you tell the difference:   COVID-19 often starts out very similar to a cold, although it can also cause a fever. If you have cold symptoms and have been around someone with COVID-19, you should get a test to find out if you have it, too.   The flu is " more likely to cause fever, body aches, and extreme tiredness than a cold.   Strep throat usually causes severe throat pain. It can also cause a fever and swollen glands in the neck. People with strep throat usually do not have other cold symptoms like a stuffy nose or cough.  If you think that you might have an illness other than the common cold, call your doctor or nurse. They can tell you what to do.  Can medicine help with a cold? -- Usually, a cold gets better on its own and does not need treatment. Because colds are usually caused by viruses, antibiotics will not help.  If you are a teen or an adult, you can try cough and cold medicines that you can get without a prescription. These medicines might help with your symptoms. But they can't cure your cold, or help you get well faster.  If you decide to try non-prescription cold medicines:   Read the directions on the label, and follow them carefully.   Do not combine 2 or more medicines that have acetaminophen in them. If you take too much acetaminophen, it can damage your liver.   If you have a heart condition, have high blood pressure, or take any prescription medicines, talk to your doctor or pharmacist before taking cold medicine. They can tell you which medicines are safe.  Some medicines are not safe for children:   If your child is younger than 6, do not give them any cold medicines. These medicines are not safe for young children. Even if your child is older than 6, cough and cold medicines are unlikely to help.   Never give aspirin to any child younger than 18 years old. In children, aspirin can cause a life-threatening condition called Reye syndrome.   When giving your child acetaminophen or other non-prescription medicines, never give more than the recommended dose.  Is there anything I can do on my own to feel better? -- Yes. You can:   Get plenty of rest.   Drink lots of fluids (water, juice, or broth) to stay hydrated. This will help replace any  fluids lost if you have a runny nose or sweating from a fever. Warm tea or soup can help soothe a sore throat.   If the air in your home feels dry, use a cool-mist humidifier. This can help a stuffy nose and make it easier to breathe.   Use saline nose drops or spray to relieve stuffiness.   Avoid smoking, and stay away from places where people are smoking.  Can the common cold lead to more serious problems? -- In some cases, yes. In some people, having a cold can lead to:   Ear infections   Worse asthma symptoms   Sinus infections   Pneumonia or bronchitis (infections of the lungs)  Can colds be prevented? -- There are some things you can do to keep germs from spreading:   Wash your hands with soap and water often (figure 1) - This can also help prevent the spread of other illnesses like the flu and COVID-19.   Cover your cough - Cough into your elbow instead of your hands. Teach children to do this, too. Throw away used tissues right away.   Clean surfaces - The germs that cause the common cold can live on tables, door handles, and other surfaces for at least 2 hours.   Stay home if you are sick - When you do need to be around other people, consider wearing a face mask until you are feeling better.  When should I call the doctor? -- Contact your doctor or nurse if you:   Lose your sense of taste or smell   Have a fever of more than 100.4°F (38°C) that comes with shaking chills, loss of appetite, or trouble breathing   Have a very bad sore throat   Have a fever and also have lung disease, such as emphysema or asthma   Have a cough that lasts longer than 10 days or starts getting worse   Have chest pain when you cough or breathe deeply, have trouble breathing, or cough up blood  If you are older than 65, or if you have any chronic medical conditions such as diabetes, contact your doctor or nurse any time you get a long-lasting cough.  Take your child to the emergency department if they:   Become confused or stop  responding to you   Have trouble breathing or have to work hard to breathe  Contact your child's doctor or nurse if the child:   Loses their sense of taste or smell or won't eat foods that they ate before   Has a very bad sore throat   Refuses to drink anything for a long time   Is younger than 4 months   Has a fever and is not acting like themselves   Has a cough that lasts for more than 2 weeks and is not getting any better or is getting worse   Has a stuffed or runny nose that gets worse or does not get any better after 10 days   Has red eyes or yellow goop coming out of their eyes   Has ear pain, pulls at their ears, or shows other signs of having an ear infection  All topics are updated as new evidence becomes available and our peer review process is complete.  This topic retrieved from [employer] on: Feb 26, 2024.  Topic 13240 Version 30.0  Release: 32.2.4 - C32.56  © 2024 UpToDate, Inc. and/or its affiliates. All rights reserved.  figure 1: How to wash your hands     Wet your hands with clean water, and apply a small amount of soap. Lather and rub hands together for at least 20 seconds. Clean your wrists, palms, backs of your hands, between your fingers, tips of your fingers, thumbs, and under and around your nails. Rinse well, and dry your hands using a clean towel.  Graphic 266744 Version 7.0  Consumer Information Use and Disclaimer   Disclaimer: This generalized information is a limited summary of diagnosis, treatment, and/or medication information. It is not meant to be comprehensive and should be used as a tool to help the user understand and/or assess potential diagnostic and treatment options. It does NOT include all information about conditions, treatments, medications, side effects, or risks that may apply to a specific patient. It is not intended to be medical advice or a substitute for the medical advice, diagnosis, or treatment of a health care provider based on the health care provider's  examination and assessment of a patient's specific and unique circumstances. Patients must speak with a health care provider for complete information about their health, medical questions, and treatment options, including any risks or benefits regarding use of medications. This information does not endorse any treatments or medications as safe, effective, or approved for treating a specific patient. UpToDate, Inc. and its affiliates disclaim any warranty or liability relating to this information or the use thereof.The use of this information is governed by the Terms of Use, available at https://www.[employer].com/en/know/clinical-effectiveness-terms. 2024© UpToDate, Inc. and its affiliates and/or licensors. All rights reserved.  Copyright   © 2024 UpToDate, Inc. and/or its affiliates. All rights reserved.

## 2024-09-30 NOTE — LETTER
September 30, 2024     Patient: Consuelo Perdomo   YOB: 1995   Date of Visit: 9/30/2024       To Whom it May Concern:    Consuelo Perdomo was seen in my clinic on 9/30/2024. She may return to work on 10/2/2024 .    If you have any questions or concerns, please don't hesitate to call.         Sincerely,          ERIK Newsome        CC: No Recipients

## 2024-10-03 ENCOUNTER — OFFICE VISIT (OUTPATIENT)
Dept: URGENT CARE | Facility: CLINIC | Age: 29
End: 2024-10-03
Payer: COMMERCIAL

## 2024-10-03 ENCOUNTER — APPOINTMENT (OUTPATIENT)
Dept: URGENT CARE | Facility: CLINIC | Age: 29
End: 2024-10-03
Payer: COMMERCIAL

## 2024-10-03 VITALS
BODY MASS INDEX: 20.02 KG/M2 | HEART RATE: 59 BPM | OXYGEN SATURATION: 99 % | HEIGHT: 63 IN | DIASTOLIC BLOOD PRESSURE: 60 MMHG | SYSTOLIC BLOOD PRESSURE: 90 MMHG | RESPIRATION RATE: 18 BRPM | WEIGHT: 113 LBS | TEMPERATURE: 97 F

## 2024-10-03 DIAGNOSIS — J40 BRONCHITIS: Primary | ICD-10-CM

## 2024-10-03 PROCEDURE — 99213 OFFICE O/P EST LOW 20 MIN: CPT

## 2024-10-03 PROCEDURE — S9088 SERVICES PROVIDED IN URGENT: HCPCS

## 2024-10-03 RX ORDER — BENZONATATE 100 MG/1
100 CAPSULE ORAL 3 TIMES DAILY PRN
Qty: 20 CAPSULE | Refills: 0 | Status: SHIPPED | OUTPATIENT
Start: 2024-10-03 | End: 2024-10-10

## 2024-10-03 RX ORDER — AZITHROMYCIN 250 MG/1
TABLET, FILM COATED ORAL
Qty: 6 TABLET | Refills: 0 | Status: SHIPPED | OUTPATIENT
Start: 2024-10-03 | End: 2024-10-07

## 2024-10-03 RX ORDER — PREDNISONE 20 MG/1
40 TABLET ORAL DAILY
Qty: 10 TABLET | Refills: 0 | Status: SHIPPED | OUTPATIENT
Start: 2024-10-03 | End: 2024-10-08

## 2024-10-03 NOTE — PROGRESS NOTES
"  [facility]        NAME: Consuelo Perdomo is a 29 y.o. female  : 1995    MRN: [id number]  DATE: October 3, 2024  TIME: 10:41 AM    Assessment and Plan   Bronchitis [J40]  1. Bronchitis  azithromycin (ZITHROMAX) 250 mg tablet    predniSONE 20 mg tablet    benzonatate (TESSALON PERLES) 100 mg capsule        Lungs clear on PE and VSS. Given symptom duration x6 days with worsening, will treat with Azithromycin, oral steroids, and Tessalon Perles PRN. Encouraged continued supportive measures.  Follow up with PCP in 3-5 days or proceed to emergency department for worsening symptoms.  Patient verbalized understanding of instructions given.       Patient Instructions     Patient Instructions   Take antibiotic as prescribed  Take oral steroids as prescribed  Continue with supportive measures, OTC Tylenol/Ibuprofen, nasal decongestants, and cough suppressants (Tessalon Perles)   Cool mist humidifiers, throat lozenges, increased fluid intake and rest   Follow up with PCP in 3-5 days  Present to ER if symptoms worsen       If tests have been performed at [facility], our office will contact you with results if changes need to be made to the care plan discussed with you at the visit.  You can review your full results on [facility] MyChart.    Patient Education     Acute bronchitis in adults   The Basics   Written by the doctors and editors at [facility]   What is bronchitis? -- This is an infection that causes a cough. It happens when the tubes that carry air into the lungs, called the \"bronchi,\" get infected (figure 1).  Usually, bronchitis happens after a person gets a cold or the flu. The viruses that cause the cold or flu infect the bronchi and irritate them. Antibiotics do not help bronchitis.  Bronchitis can also happen when a person gets an infection called \"whooping cough,\" but this is much less common. Whooping cough is caused by bacteria that can infect the bronchi. Most people get vaccines to prevent " "whooping cough, but the vaccine doesn't always work. Your doctor will be able to tell if you have whooping cough by doing an exam and listening to your cough.  This article is about \"acute\" bronchitis. This is different from \"chronic\" bronchitis, which is a lung disease that most often affects people who smoke.  What are the symptoms of bronchitis? -- The most common symptoms are:   A cough that can last up to a few weeks   Coughing up mucus that is clear, yellow, or green - Green mucus does not always mean that you have a bacterial infection.  You might also have other cold or flu symptoms, like a stuffy nose, sore throat, or headache. People with bronchitis do not usually get a fever.  Will I need tests? -- Most people with bronchitis do not need a test. But if your doctor or nurse is not sure what is causing your cough, they might do tests. For example, they might order a chest X-ray. Or if they think that you might have COVID-19, they will test you for the virus that causes the infection.  How is bronchitis treated? -- Bronchitis almost always goes away on its own. But the cough can take up to 3 weeks to get better, and sometimes even longer.  Doctors do not usually treat bronchitis with antibiotic medicines. That's because bronchitis is usually caused by a virus, and antibiotics kill bacteria, not viruses. Also, antibiotics can actually cause other problems.  To feel better, you can treat your cold and flu symptoms. You can:   Get lots of rest, and drink plenty of liquids.   Drink hot tea.   Suck on cough drops or hard candy.   Take over-the-counter cough and cold medicines.   Breath in warm, moist air, such as in the shower, over a kettle, or from a humidifier.   Take a pain-relieving medicine if you have cold or flu symptoms like headache, muscle aches, or joint pain.  Avoid smoking or being around others who smoke. This can make your cough worse.  How can I keep from getting bronchitis again? -- You can " "reduce your chance of getting bronchitis again by keeping the germs that cause bronchitis out of your body. One of the best ways to do this is to wash your hands often with soap and water. If there is no sink nearby, you can use a hand gel with alcohol in it to clean your hands.  How can I keep from spreading germs? -- In addition to washing your hands often, cover your mouth with your elbow when you sneeze or cough. Using your elbow keeps you from getting germs on your hands. If you use a tissue, throw the tissue away and wash your hands.  When should I call the doctor? -- Call for advice if you have:   A fever higher than 100.4°F (38°C), or chills   Chest pain when you cough, trouble breathing, or coughing up blood   A barking cough that makes it hard to talk   Cough and weight loss that you cannot explain   Symptoms that are not getting better after 3 weeks  All topics are updated as new evidence becomes available and our peer review process is complete.  This topic retrieved from [employer] on: May 16, 2024.  Topic 66759 Version 17.0  Release: 32.4.3 - C32.135  © 2024 UpToDate, Inc. and/or its affiliates. All rights reserved.  figure 1: Normal lungs     The lungs sit in the chest, inside the ribcage. They are covered with a thin membrane called the \"pleura.\" The windpipe, or trachea, branches into 2 smaller airways called the left and right \"bronchi.\" The space between the lungs is called the \"mediastinum.\" Lymph nodes are located within and around the lungs and mediastinum.  Graphic 93340 Version 14.0  Consumer Information Use and Disclaimer   Disclaimer: This generalized information is a limited summary of diagnosis, treatment, and/or medication information. It is not meant to be comprehensive and should be used as a tool to help the user understand and/or assess potential diagnostic and treatment options. It does NOT include all information about conditions, treatments, medications, side effects, or risks that " may apply to a specific patient. It is not intended to be medical advice or a substitute for the medical advice, diagnosis, or treatment of a health care provider based on the health care provider's examination and assessment of a patient's specific and unique circumstances. Patients must speak with a health care provider for complete information about their health, medical questions, and treatment options, including any risks or benefits regarding use of medications. This information does not endorse any treatments or medications as safe, effective, or approved for treating a specific patient. UpToDate, Inc. and its affiliates disclaim any warranty or liability relating to this information or the use thereof.The use of this information is governed by the Terms of Use, available at https://www.Qbaka.com/en/know/clinical-effectiveness-terms. 2024© UpToDate, Inc. and its affiliates and/or licensors. All rights reserved.  Copyright   © 2024 UpToDate, Inc. and/or its affiliates. All rights reserved.        Chief Complaint     Chief Complaint   Patient presents with    Cold Like Symptoms     Pt c/o cough that causes burning in her chest and pain in her back . Pt also has congestion , denies fevers, and no medications taken today. Pt taking mucinex without relief.          History of Present Illness       29-year-old female with no significant past medical history presents with daughter for complaints of ongoing nasal congestion and cough x 6 days.  Patient reports developing worsening chest congestion with burning in chest and occasional wheezing.  States cough is mainly dry and not productive.  No fever, chills, vomiting, or diarrhea.  Previously seen at this urgent care facility and diagnosed with viral illness and has been taking OTC Mucinex but reports symptoms worsening instead of improving.        Review of Systems   Review of Systems   Constitutional:  Negative for chills and fever.   HENT:  Positive for  "congestion and rhinorrhea. Negative for ear discharge, ear pain, sore throat, trouble swallowing and voice change.    Eyes:  Negative for discharge.   Respiratory:  Positive for cough and wheezing. Negative for shortness of breath.    Cardiovascular:  Negative for chest pain.   Gastrointestinal:  Negative for abdominal pain, diarrhea, nausea and vomiting.   Skin:  Negative for rash.         Current Medications       Current Outpatient Medications:     azithromycin (ZITHROMAX) 250 mg tablet, Take 2 tablets today then 1 tablet daily x 4 days, Disp: 6 tablet, Rfl: 0    benzonatate (TESSALON PERLES) 100 mg capsule, Take 1 capsule (100 mg total) by mouth 3 (three) times a day as needed for cough for up to 7 days, Disp: 20 capsule, Rfl: 0    famotidine (PEPCID) 20 mg tablet, Take 1 tablet (20 mg total) by mouth 2 (two) times a day, Disp: 30 tablet, Rfl: 0    predniSONE 20 mg tablet, Take 2 tablets (40 mg total) by mouth daily for 5 days, Disp: 10 tablet, Rfl: 0    acetaminophen (TYLENOL) 500 mg tablet, Take 500 mg by mouth every 6 (six) hours as needed for mild pain (Patient not taking: Reported on 10/3/2024), Disp: , Rfl:     Current Allergies     Allergies as of 10/03/2024 - Reviewed 10/03/2024   Allergen Reaction Noted    Nickel Rash 07/21/2023            The following portions of the patient's history were reviewed and updated as appropriate: allergies, current medications, past family history, past medical history, past social history, past surgical history and problem list.     Past Medical History:   Diagnosis Date    Anxiety     Inflammatory bowel disease     Vertigo        Past Surgical History:   Procedure Laterality Date    US GUIDED LYMPHNODE INJECTION  5/3/2019       Family History   Problem Relation Age of Onset    Lupus Mother     No Known Problems Father          Medications have been verified.        Objective   BP 90/60   Pulse 59   Temp (!) 97 °F (36.1 °C)   Resp 18   Ht 5' 3\" (1.6 m)   Wt 51.3 kg " (113 lb)   LMP 09/29/2024   SpO2 99%   BMI 20.02 kg/m²   Patient's last menstrual period was 09/29/2024.       Physical Exam     Physical Exam  Vitals and nursing note reviewed.   Constitutional:       General: She is not in acute distress.     Appearance: She is not toxic-appearing.   HENT:      Head: Normocephalic.      Right Ear: Tympanic membrane, ear canal and external ear normal.      Left Ear: Tympanic membrane, ear canal and external ear normal.      Nose: Congestion present.      Mouth/Throat:      Mouth: Mucous membranes are moist.      Pharynx: Posterior oropharyngeal erythema present.      Tonsils: No tonsillar exudate.   Eyes:      Conjunctiva/sclera: Conjunctivae normal.   Cardiovascular:      Rate and Rhythm: Normal rate and regular rhythm.      Heart sounds: Normal heart sounds.   Pulmonary:      Effort: Pulmonary effort is normal. No respiratory distress.      Breath sounds: Normal breath sounds. No stridor. No wheezing, rhonchi or rales.   Lymphadenopathy:      Cervical: No cervical adenopathy.   Skin:     General: Skin is warm and dry.   Neurological:      Mental Status: She is alert and oriented to person, place, and time.      Gait: Gait is intact.   Psychiatric:         Mood and Affect: Mood normal.         Behavior: Behavior normal.

## 2024-10-03 NOTE — PATIENT INSTRUCTIONS
"Take antibiotic as prescribed  Take oral steroids as prescribed  Continue with supportive measures, OTC Tylenol/Ibuprofen, nasal decongestants, and cough suppressants (Tessalon Perles)   Cool mist humidifiers, throat lozenges, increased fluid intake and rest   Follow up with PCP in 3-5 days  Present to ER if symptoms worsen       If tests have been performed at [facility], our office will contact you with results if changes need to be made to the care plan discussed with you at the visit.  You can review your full results on [facility] MyChart.    Patient Education     Acute bronchitis in adults   The Basics   Written by the doctors and editors at [facility]   What is bronchitis? -- This is an infection that causes a cough. It happens when the tubes that carry air into the lungs, called the \"bronchi,\" get infected (figure 1).  Usually, bronchitis happens after a person gets a cold or the flu. The viruses that cause the cold or flu infect the bronchi and irritate them. Antibiotics do not help bronchitis.  Bronchitis can also happen when a person gets an infection called \"whooping cough,\" but this is much less common. Whooping cough is caused by bacteria that can infect the bronchi. Most people get vaccines to prevent whooping cough, but the vaccine doesn't always work. Your doctor will be able to tell if you have whooping cough by doing an exam and listening to your cough.  This article is about \"acute\" bronchitis. This is different from \"chronic\" bronchitis, which is a lung disease that most often affects people who smoke.  What are the symptoms of bronchitis? -- The most common symptoms are:   A cough that can last up to a few weeks   Coughing up mucus that is clear, yellow, or green - Green mucus does not always mean that you have a bacterial infection.  You might also have other cold or flu symptoms, like a stuffy nose, sore throat, or headache. People with bronchitis do not usually get a fever.  Will I need tests? " -- Most people with bronchitis do not need a test. But if your doctor or nurse is not sure what is causing your cough, they might do tests. For example, they might order a chest X-ray. Or if they think that you might have COVID-19, they will test you for the virus that causes the infection.  How is bronchitis treated? -- Bronchitis almost always goes away on its own. But the cough can take up to 3 weeks to get better, and sometimes even longer.  Doctors do not usually treat bronchitis with antibiotic medicines. That's because bronchitis is usually caused by a virus, and antibiotics kill bacteria, not viruses. Also, antibiotics can actually cause other problems.  To feel better, you can treat your cold and flu symptoms. You can:   Get lots of rest, and drink plenty of liquids.   Drink hot tea.   Suck on cough drops or hard candy.   Take over-the-counter cough and cold medicines.   Breath in warm, moist air, such as in the shower, over a kettle, or from a humidifier.   Take a pain-relieving medicine if you have cold or flu symptoms like headache, muscle aches, or joint pain.  Avoid smoking or being around others who smoke. This can make your cough worse.  How can I keep from getting bronchitis again? -- You can reduce your chance of getting bronchitis again by keeping the germs that cause bronchitis out of your body. One of the best ways to do this is to wash your hands often with soap and water. If there is no sink nearby, you can use a hand gel with alcohol in it to clean your hands.  How can I keep from spreading germs? -- In addition to washing your hands often, cover your mouth with your elbow when you sneeze or cough. Using your elbow keeps you from getting germs on your hands. If you use a tissue, throw the tissue away and wash your hands.  When should I call the doctor? -- Call for advice if you have:   A fever higher than 100.4°F (38°C), or chills   Chest pain when you cough, trouble breathing, or coughing up  "blood   A barking cough that makes it hard to talk   Cough and weight loss that you cannot explain   Symptoms that are not getting better after 3 weeks  All topics are updated as new evidence becomes available and our peer review process is complete.  This topic retrieved from [employer] on: May 16, 2024.  Topic 70222 Version 17.0  Release: 32.4.3 - C32.135  © 2024 UpToDate, Inc. and/or its affiliates. All rights reserved.  figure 1: Normal lungs     The lungs sit in the chest, inside the ribcage. They are covered with a thin membrane called the \"pleura.\" The windpipe, or trachea, branches into 2 smaller airways called the left and right \"bronchi.\" The space between the lungs is called the \"mediastinum.\" Lymph nodes are located within and around the lungs and mediastinum.  Graphic 93260 Version 14.0  Consumer Information Use and Disclaimer   Disclaimer: This generalized information is a limited summary of diagnosis, treatment, and/or medication information. It is not meant to be comprehensive and should be used as a tool to help the user understand and/or assess potential diagnostic and treatment options. It does NOT include all information about conditions, treatments, medications, side effects, or risks that may apply to a specific patient. It is not intended to be medical advice or a substitute for the medical advice, diagnosis, or treatment of a health care provider based on the health care provider's examination and assessment of a patient's specific and unique circumstances. Patients must speak with a health care provider for complete information about their health, medical questions, and treatment options, including any risks or benefits regarding use of medications. This information does not endorse any treatments or medications as safe, effective, or approved for treating a specific patient. UpToDate, Inc. and its affiliates disclaim any warranty or liability relating to this information or the use " thereof.The use of this information is governed by the Terms of Use, available at https://www.wolterskluwer.com/en/know/clinical-effectiveness-terms. 2024© UpToDate, Inc. and its affiliates and/or licensors. All rights reserved.  Copyright   © 2024 [employer] and/or its affiliates. All rights reserved.

## 2024-10-03 NOTE — LETTER
October 3, 2024     Patient: Consuelo Perdomo   YOB: 1995   Date of Visit: 10/3/2024       To Whom it May Concern:    Consuelo Perdomo was seen in my clinic on 10/3/2024. She may return to work on 10/7/2024 .    If you have any questions or concerns, please don't hesitate to call.         Sincerely,          ERIK Newsome        CC: No Recipients

## 2024-10-30 ENCOUNTER — OFFICE VISIT (OUTPATIENT)
Dept: URGENT CARE | Facility: CLINIC | Age: 29
End: 2024-10-30
Payer: COMMERCIAL

## 2024-10-30 ENCOUNTER — APPOINTMENT (OUTPATIENT)
Dept: URGENT CARE | Facility: CLINIC | Age: 29
End: 2024-10-30
Payer: COMMERCIAL

## 2024-10-30 VITALS
OXYGEN SATURATION: 96 % | RESPIRATION RATE: 16 BRPM | HEIGHT: 63 IN | TEMPERATURE: 98.5 F | WEIGHT: 113 LBS | BODY MASS INDEX: 20.02 KG/M2 | SYSTOLIC BLOOD PRESSURE: 102 MMHG | DIASTOLIC BLOOD PRESSURE: 60 MMHG | HEART RATE: 87 BPM

## 2024-10-30 DIAGNOSIS — S09.90XA INJURY OF HEAD, INITIAL ENCOUNTER: Primary | ICD-10-CM

## 2024-10-30 DIAGNOSIS — S00.03XA CONTUSION OF SCALP, INITIAL ENCOUNTER: ICD-10-CM

## 2024-10-30 DIAGNOSIS — M54.2 NECK PAIN: ICD-10-CM

## 2024-10-30 PROCEDURE — 99213 OFFICE O/P EST LOW 20 MIN: CPT

## 2024-10-30 PROCEDURE — S9088 SERVICES PROVIDED IN URGENT: HCPCS
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
## 2024-10-30 NOTE — LETTER
October 30, 2024     Patient: Consuelo Perdomo   YOB: 1995   Date of Visit: 10/30/2024       To Whom it May Concern:    Consuelo Perdomo was seen in my clinic on 10/30/2024. She may return to work on 11/01 .    If you have any questions or concerns, please don't hesitate to call.         Sincerely,          Samson Gonzalez PA-C        CC: No Recipients

## 2024-11-06 ENCOUNTER — APPOINTMENT (OUTPATIENT)
Dept: RADIOLOGY | Facility: CLINIC | Age: 29
End: 2024-11-06
Payer: COMMERCIAL

## 2024-11-06 ENCOUNTER — OFFICE VISIT (OUTPATIENT)
Dept: URGENT CARE | Facility: CLINIC | Age: 29
End: 2024-11-06
Payer: COMMERCIAL

## 2024-11-06 VITALS
HEIGHT: 63 IN | BODY MASS INDEX: 20.02 KG/M2 | OXYGEN SATURATION: 99 % | TEMPERATURE: 99.3 F | SYSTOLIC BLOOD PRESSURE: 110 MMHG | DIASTOLIC BLOOD PRESSURE: 64 MMHG | HEART RATE: 87 BPM | RESPIRATION RATE: 16 BRPM | WEIGHT: 113 LBS

## 2024-11-06 DIAGNOSIS — J40 BRONCHITIS: Primary | ICD-10-CM

## 2024-11-06 DIAGNOSIS — R05.1 ACUTE COUGH: ICD-10-CM

## 2024-11-06 PROCEDURE — 99213 OFFICE O/P EST LOW 20 MIN: CPT

## 2024-11-06 PROCEDURE — 71046 X-RAY EXAM CHEST 2 VIEWS: CPT

## 2024-11-06 PROCEDURE — S9088 SERVICES PROVIDED IN URGENT: HCPCS

## 2024-11-06 RX ORDER — DOXYCYCLINE 100 MG/1
100 TABLET ORAL 2 TIMES DAILY
Qty: 10 TABLET | Refills: 0 | Status: SHIPPED | OUTPATIENT
Start: 2024-11-06 | End: 2024-11-11

## 2024-11-06 RX ORDER — ALBUTEROL SULFATE 90 UG/1
2 INHALANT RESPIRATORY (INHALATION) EVERY 6 HOURS PRN
Qty: 8.5 G | Refills: 0 | Status: SHIPPED | OUTPATIENT
Start: 2024-11-06

## 2024-11-06 RX ORDER — METHYLPREDNISOLONE 4 MG/1
TABLET ORAL
Qty: 1 EACH | Refills: 0 | Status: SHIPPED | OUTPATIENT
Start: 2024-11-06

## 2024-11-06 NOTE — PROGRESS NOTES
[facility]        NAME: Consuelo Perdomo is a 29 y.o. female  : 1995    MRN: [id number]  DATE: 2024  TIME: 11:17 AM    Assessment and Plan   Bronchitis [J40]  1. Bronchitis  XR chest pa and lateral    methylPREDNISolone 4 MG tablet therapy pack    albuterol (ProAir HFA) 90 mcg/act inhaler    doxycycline (ADOXA) 100 MG tablet            Patient Instructions       Follow up with PCP in 3-5 days.  Proceed to  ER if symptoms worsen.    If tests are performed, our office will contact you with results only if changes need to made to the care plan discussed with you at the visit. You can review your full results on [facility].    Chief Complaint     Chief Complaint   Patient presents with   • Cold Like Symptoms     Cough, chest congestion and fever of 101.9.         History of Present Illness       Cough, chest congestion and fever of 101.9.  Reports waxing waning symptoms over the last month but just started with a fever today.  Denies any shortness of breath, chest tightness, pleuritic chest pain.  Past medical history of smoking      Review of Systems   Review of Systems   Constitutional:  Positive for chills, fatigue and fever.   HENT:  Positive for congestion, postnasal drip, rhinorrhea and sore throat. Negative for ear pain, sinus pressure and sinus pain.    Respiratory:  Positive for cough. Negative for chest tightness, shortness of breath, wheezing and stridor.    Cardiovascular:  Negative for chest pain and palpitations.   Musculoskeletal:  Positive for myalgias.         Current Medications       Current Outpatient Medications:   •  albuterol (ProAir HFA) 90 mcg/act inhaler, Inhale 2 puffs every 6 (six) hours as needed for wheezing, Disp: 8.5 g, Rfl: 0  •  doxycycline (ADOXA) 100 MG tablet, Take 1 tablet (100 mg total) by mouth 2 (two) times a day for 5 days, Disp: 10 tablet, Rfl: 0  •  famotidine (PEPCID) 20 mg tablet, Take 1 tablet (20 mg total) by mouth 2 (two) times a  "day, Disp: 30 tablet, Rfl: 0  •  methylPREDNISolone 4 MG tablet therapy pack, Use as directed on package, Disp: 1 each, Rfl: 0  •  acetaminophen (TYLENOL) 500 mg tablet, Take 500 mg by mouth every 6 (six) hours as needed for mild pain (Patient not taking: Reported on 10/3/2024), Disp: , Rfl:     Current Allergies     Allergies as of 11/06/2024 - Reviewed 11/06/2024   Allergen Reaction Noted   • Nickel Rash 07/21/2023            The following portions of the patient's history were reviewed and updated as appropriate: allergies, current medications, past family history, past medical history, past social history, past surgical history and problem list.     Past Medical History:   Diagnosis Date   • Anxiety    • Inflammatory bowel disease    • Vertigo        Past Surgical History:   Procedure Laterality Date   • US GUIDED LYMPHNODE INJECTION  5/3/2019       Family History   Problem Relation Age of Onset   • Lupus Mother    • No Known Problems Father          Medications have been verified.        Objective   /64   Pulse 87   Temp 99.3 °F (37.4 °C)   Resp 16   Ht 5' 3\" (1.6 m)   Wt 51.3 kg (113 lb)   LMP 09/29/2024   SpO2 99%   BMI 20.02 kg/m²        Physical Exam     Physical Exam  Vitals reviewed.   Constitutional:       General: She is not in acute distress.     Appearance: Normal appearance. She is normal weight. She is not ill-appearing, toxic-appearing or diaphoretic.   HENT:      Head: Normocephalic.      Right Ear: Tympanic membrane, ear canal and external ear normal. There is no impacted cerumen.      Left Ear: Tympanic membrane, ear canal and external ear normal. There is no impacted cerumen.      Nose: Congestion and rhinorrhea present.      Mouth/Throat:      Mouth: Mucous membranes are moist.      Pharynx: Oropharynx is clear. No oropharyngeal exudate or posterior oropharyngeal erythema.   Eyes:      General: No scleral icterus.        Right eye: No discharge.         Left eye: No discharge.    "   Extraocular Movements: Extraocular movements intact.      Conjunctiva/sclera: Conjunctivae normal.      Pupils: Pupils are equal, round, and reactive to light.   Neck:      Vascular: No carotid bruit.   Cardiovascular:      Rate and Rhythm: Normal rate and regular rhythm.      Pulses: Normal pulses.      Heart sounds: Normal heart sounds. No murmur heard.     No friction rub. No gallop.   Pulmonary:      Effort: Pulmonary effort is normal. No respiratory distress.      Breath sounds: No stridor. Wheezing present. No rhonchi or rales.   Chest:      Chest wall: No tenderness.   Musculoskeletal:      Cervical back: Normal range of motion and neck supple. No rigidity or tenderness.   Lymphadenopathy:      Cervical: No cervical adenopathy.   Neurological:      Mental Status: She is alert.

## 2024-11-06 NOTE — LETTER
November 6, 2024     Patient: Consuelo Perdomo   YOB: 1995   Date of Visit: 11/6/2024       To Whom it May Concern:    Consuelo Perdomo was seen in my clinic on 11/6/2024. She may return to work on 11/08 .    If you have any questions or concerns, please don't hesitate to call.         Sincerely,          Samson Gonzalez PA-C        CC: No Recipients

## 2024-12-10 ENCOUNTER — VBI (OUTPATIENT)
Dept: ADMINISTRATIVE | Facility: OTHER | Age: 29
End: 2024-12-10

## 2024-12-10 NOTE — TELEPHONE ENCOUNTER
12/10/24 9:48 AM     Chart reviewed for Cervical Cancer Screening was/were not submitted to the patient's insurance.     Janina Arellano MA   [facility]

## 2025-02-04 ENCOUNTER — HOSPITAL ENCOUNTER (EMERGENCY)
Facility: HOSPITAL | Age: 30
Discharge: HOME | End: 2025-02-04
Attending: EMERGENCY MEDICINE | Admitting: EMERGENCY MEDICINE
Payer: COMMERCIAL

## 2025-02-04 VITALS
DIASTOLIC BLOOD PRESSURE: 62 MMHG | HEART RATE: 93 BPM | TEMPERATURE: 98.9 F | OXYGEN SATURATION: 100 % | RESPIRATION RATE: 16 BRPM | SYSTOLIC BLOOD PRESSURE: 133 MMHG

## 2025-02-04 DIAGNOSIS — K04.7 DENTAL ABSCESS: Primary | ICD-10-CM

## 2025-02-04 PROCEDURE — 99282 EMERGENCY DEPT VISIT SF MDM: CPT

## 2025-02-04 PROCEDURE — 99284 EMERGENCY DEPT VISIT MOD MDM: CPT | Performed by: EMERGENCY MEDICINE

## 2025-02-04 PROCEDURE — 41800 DRAINAGE OF GUM LESION: CPT | Performed by: EMERGENCY MEDICINE

## 2025-02-04 RX ORDER — ACETAMINOPHEN 325 MG/1
650 TABLET ORAL EVERY 6 HOURS PRN
Qty: 30 TABLET | Refills: 0 | Status: SHIPPED | OUTPATIENT
Start: 2025-02-04

## 2025-02-04 RX ORDER — IBUPROFEN 800 MG/1
800 TABLET, FILM COATED ORAL 3 TIMES DAILY
Qty: 21 TABLET | Refills: 0 | Status: SHIPPED | OUTPATIENT
Start: 2025-02-04

## 2025-02-04 RX ORDER — CLINDAMYCIN HYDROCHLORIDE 150 MG/1
450 CAPSULE ORAL ONCE
Status: COMPLETED | OUTPATIENT
Start: 2025-02-04 | End: 2025-02-04

## 2025-02-04 RX ORDER — CLINDAMYCIN HYDROCHLORIDE 150 MG/1
450 CAPSULE ORAL EVERY 8 HOURS SCHEDULED
Qty: 28 CAPSULE | Refills: 0 | Status: SHIPPED | OUTPATIENT
Start: 2025-02-04 | End: 2025-02-11

## 2025-02-04 RX ADMIN — CLINDAMYCIN HYDROCHLORIDE 450 MG: 150 CAPSULE ORAL at 21:26

## 2025-02-05 NOTE — ED PROVIDER NOTES
Time reflects when diagnosis was documented in both MDM as applicable and the Disposition within this note       Time User Action Codes Description Comment    2/4/2025  9:15 PM Wilner Moeller Add [K04.7] Dental abscess           ED Disposition       ED Disposition   Discharge    Condition   Stable    Date/Time   Tue Feb 4, 2025  9:15 PM    Comment   Consuelo Perdomo discharge to home/self care.                   Assessment & Plan       Medical Decision Making  29-year-old female presents to the emergency department with dental abscess, wound was anesthetized, aspirated, and stab incision was performed.  Moderate amount of pus aspirated.  Started patient on clindamycin.  She will follow-up with oral surgery/dentist for further evaluation.  Strict return precautions given.  Patient or stands and agrees with treatment plan.    Risk  OTC drugs.  Prescription drug management.             Medications   clindamycin (CLEOCIN) capsule 450 mg (has no administration in time range)       ED Risk Strat Scores                                              History of Present Illness       Chief Complaint   Patient presents with    Dental Pain     Left sided dental abscess with pain x2 days       Past Medical History:   Diagnosis Date    Anxiety     Inflammatory bowel disease     Vertigo       Past Surgical History:   Procedure Laterality Date    US GUIDED LYMPHNODE INJECTION  5/3/2019      Family History   Problem Relation Age of Onset    Lupus Mother     No Known Problems Father       Social History     Tobacco Use    Smoking status: Every Day     Current packs/day: 1.00     Types: Cigarettes     Passive exposure: Current    Smokeless tobacco: Never   Vaping Use    Vaping status: Never Used   Substance Use Topics    Alcohol use: Not Currently     Comment: occas    Drug use: Never      E-Cigarette/Vaping    E-Cigarette Use Never User       E-Cigarette/Vaping Substances      I have reviewed and agree with the history as documented.      29-year-old female presents to the emergency department with dental pain.  Patient stated she has had a dental abscess for about 2 days, she has had incision and drainage before in the past.  Patient denies any chills, fevers, or systemic complaints.  Patient denies other medical complaints.  On initial evaluation, patient does have a 1 to 2 cm abscess on the left aspect of her hard palate.      Dental Pain  Associated symptoms: no fever        Review of Systems   Constitutional:  Negative for chills and fever.   HENT:  Positive for dental problem. Negative for ear pain and sore throat.    Eyes:  Negative for pain and visual disturbance.   Respiratory:  Negative for cough and shortness of breath.    Cardiovascular:  Negative for chest pain and palpitations.   Gastrointestinal:  Negative for abdominal pain and vomiting.   Genitourinary:  Negative for dysuria and hematuria.   Musculoskeletal:  Negative for arthralgias and back pain.   Skin:  Negative for color change and rash.   Neurological:  Negative for seizures and syncope.   All other systems reviewed and are negative.          Objective       ED Triage Vitals [02/04/25 1848]   Temperature Pulse Blood Pressure Respirations SpO2 Patient Position - Orthostatic VS   98.9 °F (37.2 °C) 94 108/65 18 100 % --      Temp Source Heart Rate Source BP Location FiO2 (%) Pain Score    Temporal Monitor Left arm -- --      Vitals      Date and Time Temp Pulse SpO2 Resp BP Pain Score FACES Pain Rating User   02/04/25 1848 98.9 °F (37.2 °C) 94 100 % 18 108/65 -- -- SS            Physical Exam  Vitals and nursing note reviewed.   Constitutional:       General: She is not in acute distress.     Appearance: She is well-developed.   HENT:      Head: Normocephalic and atraumatic.   Eyes:      Conjunctiva/sclera: Conjunctivae normal.   Cardiovascular:      Rate and Rhythm: Normal rate and regular rhythm.   Pulmonary:      Effort: Pulmonary effort is normal. No respiratory distress.       Breath sounds: Normal breath sounds.   Abdominal:      Palpations: Abdomen is soft.      Tenderness: There is no abdominal tenderness.   Musculoskeletal:         General: No swelling.      Cervical back: Neck supple.   Skin:     General: Skin is warm and dry.      Capillary Refill: Capillary refill takes less than 2 seconds.   Neurological:      Mental Status: She is alert.   Psychiatric:         Mood and Affect: Mood normal.         Results Reviewed       None            No orders to display       Incision and drain    Date/Time: 2/4/2025 9:19 PM    Performed by: Wilner Moeller DO  Authorized by: Wilner Moeller DO  [facility] Protocol:  procedure performed by consultantConsent: Verbal consent obtained.  Risks and benefits: risks, benefits and alternatives were discussed  Consent given by: patient  Timeout called at: 2/4/2025 9:19 PM.  Patient understanding: patient states understanding of the procedure being performed  Patient identity confirmed: verbally with patient and hospital-assigned identification number    Patient location:  ED  Location:     Type:  Abscess    Size:  2    Location:  Mouth    Location Detail:  Intraoral    Mouth location:  Palate  Anesthesia (see MAR for exact dosages):     Anesthesia method:  Local infiltration    Local anesthetic:  Lidocaine 1% w/o epi  Procedure details:     Complexity:  Simple    Needle aspiration: yes      Needle size:  25 G    Incision types:  Stab incision    Aspiration type: puncture aspiration      Approach:  Puncture    Incision depth:  Submucosal    Drainage:  Purulent and serosanguinous    Drainage amount:  Moderate    Wound treatment:  Wound left open    Packing materials:  None  Post-procedure details:     Patient tolerance of procedure:  Tolerated well, no immediate complications      ED Medication and Procedure Management   Prior to Admission Medications   Prescriptions Last Dose Informant Patient Reported? Taking?    acetaminophen (TYLENOL) 500 mg tablet   Yes No   Sig: Take 500 mg by mouth every 6 (six) hours as needed for mild pain   Patient not taking: Reported on 10/3/2024   albuterol (ProAir HFA) 90 mcg/act inhaler   No No   Sig: Inhale 2 puffs every 6 (six) hours as needed for wheezing   famotidine (PEPCID) 20 mg tablet   No No   Sig: Take 1 tablet (20 mg total) by mouth 2 (two) times a day   methylPREDNISolone 4 MG tablet therapy pack   No No   Sig: Use as directed on package      Facility-Administered Medications: None     Patient's Medications   Discharge Prescriptions    ACETAMINOPHEN (TYLENOL) 325 MG TABLET    Take 2 tablets (650 mg total) by mouth every 6 (six) hours as needed for mild pain       Start Date: 2/4/2025  End Date: --       Order Dose: 650 mg       Quantity: 30 tablet    Refills: 0    CLINDAMYCIN (CLEOCIN) 150 MG CAPSULE    Take 3 capsules (450 mg total) by mouth every 8 (eight) hours for 7 days       Start Date: 2/4/2025  End Date: 2/11/2025       Order Dose: 450 mg       Quantity: 28 capsule    Refills: 0    IBUPROFEN (MOTRIN) 800 MG TABLET    Take 1 tablet (800 mg total) by mouth 3 (three) times a day       Start Date: 2/4/2025  End Date: --       Order Dose: 800 mg       Quantity: 21 tablet    Refills: 0     No discharge procedures on file.  ED SEPSIS DOCUMENTATION   Time reflects when diagnosis was documented in both MDM as applicable and the Disposition within this note       Time User Action Codes Description Comment    2/4/2025  9:15 PM Wilner Moeller [K04.7] Dental abscess                  Wilner Moeller DO  02/04/25 2126

## 2025-02-05 NOTE — DISCHARGE INSTRUCTIONS
You are seen in the emergency department for dental abscess, we performed an incision and drainage, and provided you with antibiotics.  Please follow-up with your dentist/oral surgeon for further evaluation.  If your symptoms worsen or persist, please return to the emergency department immediately.